# Patient Record
Sex: MALE | Employment: OTHER | URBAN - METROPOLITAN AREA
[De-identification: names, ages, dates, MRNs, and addresses within clinical notes are randomized per-mention and may not be internally consistent; named-entity substitution may affect disease eponyms.]

---

## 2020-09-02 ENCOUNTER — HOSPITAL ENCOUNTER (OUTPATIENT)
Age: 65
Setting detail: SPECIMEN
Discharge: HOME | End: 2020-09-02
Payer: MEDICARE

## 2020-09-02 LAB
DIRECT EXAM: NORMAL
Lab: NORMAL
SPECIMEN DESCRIPTION: NORMAL

## 2020-09-06 LAB
CULTURE: ABNORMAL
DIRECT EXAM: ABNORMAL
Lab: ABNORMAL
SPECIMEN DESCRIPTION: ABNORMAL

## 2021-07-06 ENCOUNTER — HOSPITAL ENCOUNTER (EMERGENCY)
Facility: CLINIC | Age: 66
Discharge: HOME | End: 2021-07-06
Attending: EMERGENCY MEDICINE
Payer: MEDICARE

## 2021-07-06 VITALS
OXYGEN SATURATION: 95 % | RESPIRATION RATE: 16 BRPM | WEIGHT: 295 LBS | BODY MASS INDEX: 34.13 KG/M2 | HEIGHT: 78 IN | HEART RATE: 79 BPM | SYSTOLIC BLOOD PRESSURE: 161 MMHG | TEMPERATURE: 98 F | DIASTOLIC BLOOD PRESSURE: 92 MMHG

## 2021-07-06 DIAGNOSIS — S01.01XA LACERATION OF SCALP, INITIAL ENCOUNTER: Primary | ICD-10-CM

## 2021-07-06 PROCEDURE — 2500000003 HC RX 250 WO HCPCS: Performed by: EMERGENCY MEDICINE

## 2021-07-06 PROCEDURE — 99283 EMERGENCY DEPT VISIT LOW MDM: CPT

## 2021-07-06 PROCEDURE — 6370000000 HC RX 637 (ALT 250 FOR IP): Performed by: EMERGENCY MEDICINE

## 2021-07-06 PROCEDURE — 12002 RPR S/N/AX/GEN/TRNK2.6-7.5CM: CPT

## 2021-07-06 RX ORDER — GINSENG 100 MG
CAPSULE ORAL 3 TIMES DAILY
Status: DISCONTINUED | OUTPATIENT
Start: 2021-07-06 | End: 2021-07-06 | Stop reason: HOSPADM

## 2021-07-06 RX ORDER — LIDOCAINE HYDROCHLORIDE 10 MG/ML
20 INJECTION, SOLUTION INFILTRATION; PERINEURAL ONCE
Status: COMPLETED | OUTPATIENT
Start: 2021-07-06 | End: 2021-07-06

## 2021-07-06 RX ADMIN — Medication 3 ML: at 07:48

## 2021-07-06 RX ADMIN — BACITRACIN: 500 OINTMENT TOPICAL at 08:39

## 2021-07-06 RX ADMIN — LIDOCAINE HYDROCHLORIDE 20 ML: 10 INJECTION, SOLUTION INFILTRATION; PERINEURAL at 08:11

## 2021-07-06 ASSESSMENT — PAIN SCALES - GENERAL
PAINLEVEL_OUTOF10: 1
PAINLEVEL_OUTOF10: 1

## 2021-07-06 ASSESSMENT — PAIN DESCRIPTION - DESCRIPTORS: DESCRIPTORS: ACHING;SHARP

## 2021-07-06 ASSESSMENT — PAIN DESCRIPTION - PAIN TYPE: TYPE: ACUTE PAIN

## 2021-07-06 ASSESSMENT — ENCOUNTER SYMPTOMS
PHOTOPHOBIA: 0
BACK PAIN: 0
NAUSEA: 0

## 2021-07-06 ASSESSMENT — PAIN DESCRIPTION - LOCATION: LOCATION: FACE

## 2021-07-06 ASSESSMENT — PAIN DESCRIPTION - FREQUENCY: FREQUENCY: CONTINUOUS

## 2021-07-06 NOTE — ED PROVIDER NOTES
[facility]  [facility]  Phone: [phone]        Pt Name: Shantelle Cornelius  MRN: [id number]  [place] 1955  Date of evaluation: 7/6/21      CHIEF COMPLAINT     Chief Complaint   Patient presents with    Fall    Laceration         HISTORY OF PRESENT ILLNESS  (Location/Symptom, Timing/Onset, Context/Setting, Quality, Duration, Modifying Factors, Severity.)    Shantelle Cornelius is a 77 y.o. male who presents with a head laceration. The patient states that he hit his head on a cabinet causing approximately 4 cm laceration of the scalp bleeding is controlled with direct pressure no loss of consciousness no headache no visual no hearing changes no neck pain last tetanus is unknown nothing makes his symptoms better or worse      REVIEW OF SYSTEMS    (2-9 systems for level 4, 10 or more for level 5)     Review of Systems   Eyes: Negative for photophobia and visual disturbance. Gastrointestinal: Negative for nausea. Musculoskeletal: Negative for back pain and neck pain. Skin: Positive for wound. Neurological: Negative for weakness, numbness and headaches. PAST MEDICAL HISTORY    has no past medical history on file. SURGICAL HISTORY      has no past surgical history on file. CURRENTMEDICATIONS       Previous Medications    No medications on file       ALLERGIES     has No Known Allergies. FAMILY HISTORY     has no family status information on file. family history is not on file. SOCIAL HISTORY      reports that he has never smoked. He has never used smokeless tobacco. He reports current alcohol use. He reports that he does not use drugs. PHYSICAL EXAM    (up to 7 for level 4, 8 or more for level 5)   INITIAL VITALS:  height is 6' 8\" (2.032 m) and weight is 133.8 kg (295 lb). His oral temperature is 98 °F (36.7 °C). His blood pressure is 161/92 (abnormal) and his pulse is 79. His respiration is 16 and oxygen saturation is 95%.       Physical Exam  Vitals and nursing note reviewed. Constitutional:       Appearance: Normal appearance. HENT:      Head:      Comments: Approximately 4 cm laceration of the scalp no hemotympanum pupils equal round reactive to light     Right Ear: Tympanic membrane normal.      Left Ear: Tympanic membrane normal.   Eyes:      Pupils: Pupils are equal, round, and reactive to light. Neck:      Comments: No posterior neck pain to palpation flexion extension or rotation of the neck  Musculoskeletal:         General: Normal range of motion. Cervical back: Normal range of motion and neck supple. Skin:     Comments: Approximately 4 cm laceration of the scalp otherwise without further rashes or lesions   Neurological:      General: No focal deficit present. Mental Status: He is alert. Comments: GCS of 15 with no focal deficits appreciated         DIFFERENTIAL DIAGNOSIS/ MDM:     I did recommend a tetanus which the patient refuses I discussed the risks and benefits of a CT scan and the patient refuses the CT he states he simply here for laceration repair he had driven to to urgent cares as he only wanted to repair the laceration he is agreeable to us putting sutures into the laceration initially he was requesting Steri-Strips so I will go ahead and apply let to the wound and then repair the laceration    DIAGNOSTIC RESULTS       LABS:  No results found for this visit on 07/06/21.         EMERGENCY DEPARTMENT COURSE:   Vitals:    Vitals:    07/06/21 0733   BP: (!) 161/92   Pulse: 79   Resp: 16   Temp: 98 °F (36.7 °C)   TempSrc: Oral   SpO2: 95%   Weight: 133.8 kg (295 lb)   Height: 6' 8\" (2.032 m)     -------------------------  BP: (!) 161/92, Temp: 98 °F (36.7 °C), Pulse: 79, Resp: 16      RE-EVALUATION:  The patient had repair of his 4 cm scalp laceration with a total of five 4-0 nylon sutures I am recommending that he keep the area clean and dry may apply antibiotic ointment return to the ER for any signs of infection such as redness swelling fever drainage or other concerns otherwise to get a wound check by his family doctor within the next few days and the sutures are to be removed in approximately 7 days    The patient was instructed to follow up in 2-3 days with their family doctor for a wound check. We also discussed returning to the Emergency Department immediately if new or worsening symptoms occur. We have discussed the symptoms which are most concerning (e.g., increasing pain, fever, drainage from the wound or other signs of infection, numbness, weakness, color change or coldness to the extremity) that necessitate immediate return. The patient was advised to keep the wound clean and dry, they may apply antibiotic ointment to the wound. The patient understands that at this time there is no evidence for a more malignant underlying process, but the patient also understands that early in the process of an illness or injury, an emergency department workup can be falsely reassuring. Routine discharge counseling was given, and the patient understands that worsening, changing or persistent symptoms should prompt an immediate call or follow up with their primary physician or return to the emergency department. The importance of appropriate follow up was also discussed. I have reviewed the disposition diagnosis with the patient and or their family/guardian. I have answered their questions and given discharge instructions. They voiced understanding of these instructions and did not have any further questions or complaints.     PROCEDURES:  The patient has LET applied to the wound it is then further anesthetized with approximately 6 cc 1% lidocaine the wound is copiously scrubbed explored there are no foreign bodies no signs of infection the area surgically prepped and draped and a total of five 4 nylon sutures were placed with good approximation of the wound edges no further bleeding appreciated the patient tolerated this well without any complications    FINAL IMPRESSION      1. Laceration of scalp, initial encounter          DISPOSITION/PLAN   DISPOSITION Decision To Discharge 07/06/2021 08:30:13 AM      CONDITION ON DISPOSITION:   Improved - Stable    PATIENT REFERRED TO:  Nessa Marmolejo MD  [facility]. , [id number]  Formerly named [facility] [address] [facility]  [phone]    Call in 2 days        DISCHARGE MEDICATIONS:  New Prescriptions    No medications on file       (Please note that portions of this note were completed with a voicerecognition program.  Efforts were made to edit the dictations but occasionally words are mis-transcribed.)    Yomi Chacko MD,, MD, F.A.C.E.P.   Attending Emergency Medicine Physician       Yomi Chacko MD  07/06/21 2573

## 2022-09-20 ENCOUNTER — HOSPITAL ENCOUNTER (EMERGENCY)
Facility: CLINIC | Age: 67
Discharge: ANOTHER HOSPITAL | DRG: 871 | End: 2022-09-20
Attending: EMERGENCY MEDICINE
Payer: MEDICARE

## 2022-09-20 ENCOUNTER — APPOINTMENT (OUTPATIENT)
Dept: CT IMAGING | Facility: CLINIC | Age: 67
DRG: 871 | End: 2022-09-20
Payer: MEDICARE

## 2022-09-20 ENCOUNTER — APPOINTMENT (OUTPATIENT)
Dept: GENERAL RADIOLOGY | Facility: CLINIC | Age: 67
DRG: 871 | End: 2022-09-20
Payer: MEDICARE

## 2022-09-20 ENCOUNTER — HOSPITAL ENCOUNTER (INPATIENT)
Age: 67
LOS: 2 days | Discharge: HOME | DRG: 871 | End: 2022-09-22
Attending: EMERGENCY MEDICINE
Payer: MEDICARE

## 2022-09-20 VITALS
OXYGEN SATURATION: 98 % | RESPIRATION RATE: 16 BRPM | SYSTOLIC BLOOD PRESSURE: 107 MMHG | TEMPERATURE: 98.9 F | BODY MASS INDEX: 32.4 KG/M2 | WEIGHT: 280 LBS | HEIGHT: 78 IN | DIASTOLIC BLOOD PRESSURE: 76 MMHG | HEART RATE: 80 BPM

## 2022-09-20 DIAGNOSIS — J05.10 ACUTE EPIGLOTTITIS WITHOUT AIRWAY OBSTRUCTION: Primary | ICD-10-CM

## 2022-09-20 DIAGNOSIS — U07.1 COVID-19: ICD-10-CM

## 2022-09-20 DIAGNOSIS — J05.10 EPIGLOTTITIS: Primary | ICD-10-CM

## 2022-09-20 LAB
ANION GAP SERPL CALCULATED.3IONS-SCNC: 10 MMOL/L (ref 9–17)
BUN BLDV-MCNC: 15 MG/DL (ref 8–23)
CALCIUM SERPL-MCNC: 9.2 MG/DL (ref 8.6–10.4)
CHLORIDE BLD-SCNC: 97 MMOL/L (ref 98–107)
CO2: 27 MMOL/L (ref 20–31)
CREAT SERPL-MCNC: 1.1 MG/DL (ref 0.7–1.2)
FLU A ANTIGEN: NEGATIVE
FLU B ANTIGEN: NEGATIVE
GFR AFRICAN AMERICAN: >60 ML/MIN
GFR NON-AFRICAN AMERICAN: >60 ML/MIN
GFR SERPL CREATININE-BSD FRML MDRD: ABNORMAL ML/MIN/{1.73_M2}
GLUCOSE BLD-MCNC: 139 MG/DL (ref 70–99)
LACTIC ACID: 1.2 MMOL/L (ref 0.5–2.2)
POTASSIUM SERPL-SCNC: 3.7 MMOL/L (ref 3.7–5.3)
S PYO AG THROAT QL: NEGATIVE
SARS-COV-2, RAPID: DETECTED
SODIUM BLD-SCNC: 134 MMOL/L (ref 135–144)
SOURCE: NORMAL
SPECIMEN DESCRIPTION: ABNORMAL

## 2022-09-20 PROCEDURE — 71046 X-RAY EXAM CHEST 2 VIEWS: CPT

## 2022-09-20 PROCEDURE — 6360000002 HC RX W HCPCS: Performed by: REGISTERED NURSE

## 2022-09-20 PROCEDURE — 99285 EMERGENCY DEPT VISIT HI MDM: CPT

## 2022-09-20 PROCEDURE — 70360 X-RAY EXAM OF NECK: CPT

## 2022-09-20 PROCEDURE — 36415 COLL VENOUS BLD VENIPUNCTURE: CPT

## 2022-09-20 PROCEDURE — 6370000000 HC RX 637 (ALT 250 FOR IP): Performed by: REGISTERED NURSE

## 2022-09-20 PROCEDURE — 87880 STREP A ASSAY W/OPTIC: CPT

## 2022-09-20 PROCEDURE — 96374 THER/PROPH/DIAG INJ IV PUSH: CPT

## 2022-09-20 PROCEDURE — 70491 CT SOFT TISSUE NECK W/DYE: CPT

## 2022-09-20 PROCEDURE — 99221 1ST HOSP IP/OBS SF/LOW 40: CPT | Performed by: OTOLARYNGOLOGY

## 2022-09-20 PROCEDURE — 96375 TX/PRO/DX INJ NEW DRUG ADDON: CPT

## 2022-09-20 PROCEDURE — 6360000004 HC RX CONTRAST MEDICATION: Performed by: EMERGENCY MEDICINE

## 2022-09-20 PROCEDURE — 96376 TX/PRO/DX INJ SAME DRUG ADON: CPT

## 2022-09-20 PROCEDURE — 83605 ASSAY OF LACTIC ACID: CPT

## 2022-09-20 PROCEDURE — 87040 BLOOD CULTURE FOR BACTERIA: CPT

## 2022-09-20 PROCEDURE — 87635 SARS-COV-2 COVID-19 AMP PRB: CPT

## 2022-09-20 PROCEDURE — 2580000003 HC RX 258: Performed by: EMERGENCY MEDICINE

## 2022-09-20 PROCEDURE — 87804 INFLUENZA ASSAY W/OPTIC: CPT

## 2022-09-20 PROCEDURE — 6360000002 HC RX W HCPCS: Performed by: STUDENT IN AN ORGANIZED HEALTH CARE EDUCATION/TRAINING PROGRAM

## 2022-09-20 PROCEDURE — 2060000000 HC ICU INTERMEDIATE R&B

## 2022-09-20 PROCEDURE — 2580000003 HC RX 258: Performed by: REGISTERED NURSE

## 2022-09-20 PROCEDURE — 80048 BASIC METABOLIC PNL TOTAL CA: CPT

## 2022-09-20 PROCEDURE — 85025 COMPLETE CBC W/AUTO DIFF WBC: CPT

## 2022-09-20 RX ORDER — 0.9 % SODIUM CHLORIDE 0.9 %
1000 INTRAVENOUS SOLUTION INTRAVENOUS ONCE
Status: COMPLETED | OUTPATIENT
Start: 2022-09-20 | End: 2022-09-20

## 2022-09-20 RX ORDER — 0.9 % SODIUM CHLORIDE 0.9 %
70 INTRAVENOUS SOLUTION INTRAVENOUS ONCE
Status: COMPLETED | OUTPATIENT
Start: 2022-09-20 | End: 2022-09-20

## 2022-09-20 RX ORDER — ACETAMINOPHEN 500 MG
1000 TABLET ORAL ONCE
Status: COMPLETED | OUTPATIENT
Start: 2022-09-20 | End: 2022-09-20

## 2022-09-20 RX ORDER — DEXAMETHASONE SODIUM PHOSPHATE 10 MG/ML
10 INJECTION INTRAMUSCULAR; INTRAVENOUS ONCE
Status: COMPLETED | OUTPATIENT
Start: 2022-09-20 | End: 2022-09-20

## 2022-09-20 RX ORDER — DEXAMETHASONE SODIUM PHOSPHATE 10 MG/ML
10 INJECTION, SOLUTION INTRAMUSCULAR; INTRAVENOUS ONCE
Status: COMPLETED | OUTPATIENT
Start: 2022-09-20 | End: 2022-09-20

## 2022-09-20 RX ORDER — SODIUM CHLORIDE 0.9 % (FLUSH) 0.9 %
10 SYRINGE (ML) INJECTION PRN
Status: DISCONTINUED | OUTPATIENT
Start: 2022-09-20 | End: 2022-09-20 | Stop reason: HOSPADM

## 2022-09-20 RX ORDER — KETOROLAC TROMETHAMINE 15 MG/ML
15 INJECTION, SOLUTION INTRAMUSCULAR; INTRAVENOUS ONCE
Status: COMPLETED | OUTPATIENT
Start: 2022-09-20 | End: 2022-09-20

## 2022-09-20 RX ADMIN — DEXAMETHASONE SODIUM PHOSPHATE 10 MG: 10 INJECTION, SOLUTION INTRAMUSCULAR; INTRAVENOUS at 14:59

## 2022-09-20 RX ADMIN — DEXAMETHASONE SODIUM PHOSPHATE 10 MG: 10 INJECTION INTRAMUSCULAR; INTRAVENOUS at 19:25

## 2022-09-20 RX ADMIN — SODIUM CHLORIDE 70 ML: 9 INJECTION, SOLUTION INTRAVENOUS at 16:31

## 2022-09-20 RX ADMIN — SODIUM CHLORIDE 1000 ML: 9 INJECTION, SOLUTION INTRAVENOUS at 17:19

## 2022-09-20 RX ADMIN — IOPAMIDOL 75 ML: 755 INJECTION, SOLUTION INTRAVENOUS at 16:31

## 2022-09-20 RX ADMIN — KETOROLAC TROMETHAMINE 15 MG: 15 INJECTION, SOLUTION INTRAMUSCULAR; INTRAVENOUS at 14:59

## 2022-09-20 RX ADMIN — SODIUM CHLORIDE, PRESERVATIVE FREE 10 ML: 5 INJECTION INTRAVENOUS at 16:32

## 2022-09-20 RX ADMIN — ACETAMINOPHEN 1000 MG: 500 TABLET ORAL at 14:57

## 2022-09-20 RX ADMIN — SODIUM CHLORIDE 1000 ML: 9 INJECTION, SOLUTION INTRAVENOUS at 15:01

## 2022-09-20 RX ADMIN — CEFTRIAXONE SODIUM 1000 MG: 1 INJECTION, POWDER, FOR SOLUTION INTRAMUSCULAR; INTRAVENOUS at 16:12

## 2022-09-20 ASSESSMENT — LIFESTYLE VARIABLES: HOW OFTEN DO YOU HAVE A DRINK CONTAINING ALCOHOL: NEVER

## 2022-09-20 ASSESSMENT — ENCOUNTER SYMPTOMS
SORE THROAT: 1
WHEEZING: 0
DIARRHEA: 0
ABDOMINAL PAIN: 0
VOICE CHANGE: 1
TROUBLE SWALLOWING: 0
STRIDOR: 0
FACIAL SWELLING: 0
VOMITING: 0
COUGH: 1
SHORTNESS OF BREATH: 0
VOICE CHANGE: 1
TROUBLE SWALLOWING: 1
RHINORRHEA: 0
ABDOMINAL PAIN: 0
NAUSEA: 0
NAUSEA: 0
SORE THROAT: 1
VOMITING: 0
SHORTNESS OF BREATH: 0
BACK PAIN: 0
DIARRHEA: 0

## 2022-09-20 ASSESSMENT — PAIN - FUNCTIONAL ASSESSMENT: PAIN_FUNCTIONAL_ASSESSMENT: 0-10

## 2022-09-20 ASSESSMENT — PAIN SCALES - GENERAL: PAINLEVEL_OUTOF10: 5

## 2022-09-20 NOTE — ED NOTES
First set of culture taken from the left forearm after starting antibiotic. 9 mL in orange, 10 mL in green.      Mekhi Temple RN  09/20/22 2529

## 2022-09-20 NOTE — ED PROVIDER NOTES
[facility] ED  [address] [facility]  Phone: [phone]      Attending Physician [address]       Chief Complaint   Patient presents with    Pharyngitis       DIAGNOSTIC RESULTS     LABS:  Labs Reviewed   COVID-19, RAPID - Abnormal; Notable for the following components:       Result Value    SARS-CoV-2, Rapid DETECTED (*)     All other components within normal limits   CBC WITH AUTO DIFFERENTIAL - Abnormal; Notable for the following components:    WBC 18.3 (*)     Monocytes 9 (*)     Eosinophils % 0 (*)     Segs Absolute 11.53 (*)     Absolute Lymph # 5.12 (*)     Absolute Mono # 1.65 (*)     All other components within normal limits   BASIC METABOLIC PANEL - Abnormal; Notable for the following components:    Glucose 139 (*)     Sodium 134 (*)     Chloride 97 (*)     All other components within normal limits   STREP SCREEN GROUP A THROAT   RAPID INFLUENZA A/B ANTIGENS   CULTURE, BLOOD 1   CULTURE, BLOOD 1   LACTIC ACID       All other labs were within normal range or not returned as of this dictation. RADIOLOGY:  CT SOFT TISSUE NECK W CONTRAST   Final Result   Findings of acute supraglottitis/epiglottitis with associated moderate to   severe airway narrowing. No evidence of abscess or enhancing mass to suggest   malignancy. Findings were discussed with Dr. Lynch at 5:08 pm on 9/20/2022. XR NECK SOFT TISSUE   Final Result   Blunted thumb like epiglottis. Epiglottitis not excluded. RECOMMENDATION:   Advise CT soft tissues neck.          XR CHEST (2 VW)   Final Result   No acute cardiopulmonary disease               EMERGENCY DEPARTMENT COURSE:   Vitals:    Vitals:    09/20/22 1437 09/20/22 1606   BP: 129/86 96/66   Pulse: (!) 105 97   Resp: 18 16   Temp: 100.4 °F (38 °C)    TempSrc: Oral    SpO2: 94% 95%   Weight: 127 kg (280 lb)    Height: 6' 9\" (2.057 m)      -------------------------  BP: 96/66, Temp: 100.4 °F (38 °C), Heart Rate: 97, Resp: 16      ED Course as of 09/20/22 1723   Tue Sep 20, 2022   1708 Dr. Silvio De La Paz spoke with radiologist regarding CT scan, CT does showed epiglottitis with moderate airway narrowing. [TM]   7675 Spoke with Dr. Lisa Decker, attending at [facility], he does accept the patient as a transfer for ER to ER for acute epiglottitis. Access to arrange stat transport, will page ENT at [facility]. [TM]      ED Course User Index  [TM] [address], APRN - CNP         PERTINENT ATTENDING PHYSICIAN COMMENTS:    I performed a history and physical examination of the patient and discussed management with the mid level provider. I reviewed the mid level provider's note and agree with the documented findings and plan of care. Any areas of disagreement are noted on the chart. I was personally present for the key portions of any procedures. I have documented in the chart those procedures where I was not present during the key portions. I have reviewed the emergency nurses triage note. I agree with the chief complaint, past medical history, past surgical history, allergies, medications, social and family history as documented unless otherwise noted below. Documentation of the HPI, Physical Exam and Medical Decision Making performed by mid level providers is based on my personal performance of the HPI, PE and MDM. For Physician Assistant/ Nurse Practitioner cases/documentation I have personally evaluated this patient and have completed at least one if not all key elements of the E/M (history, physical exam, and MDM). Additional findings are as noted. Patient had presented to the emergency department with sore throat and cough. On presentation, seem to be having a little bit of intolerance of oral secretions, phonation sounded to be a little bit coarse. Work-up was initially broad and included blood work, COVID swab, strep screen, chest x-ray and neck x-ray. COVID swab was positive. Strep screen was negative.   Chest x-ray was negative. Throat x-ray revealed findings concerning for epiglottitis so a CT scan was ordered. He had a white blood cell count of 18,000. He was presumptively started on antibiotics, shortly after blood cultures and lactic acid were added on but we did not want to hold up antibiotic administration. Got CT scan of the neck which did reveal epiglottitis. His condition remained relatively stable in the emergency department, spoke to the attending physician, Dr. Lisa Alvarez at [facility] who is excepting the patient in transfer    Critical Care: The high probability of sudden, clinically significant deterioration in the patient's condition required the highest level of my preparedness to intervene urgently. The services I provided to this patient were to treat and/or prevent clinically significant deterioration. Services included the following: chart data review, reviewing nursing notes and/or old charts, documentation time, consultant collaboration regarding findings and treatment options, medication orders and management, direct patient care, vital sign assessments and ordering, interpreting and reviewing diagnostic studies/lab tests. Aggregate critical care time includes only time during which I was engaged in work directly related to the patient's care, as described above, whether at the bedside or elsewhere in the Emergency Department. It did not include time spent performing other reported procedures or the services of residents, students, nurses or physician assistants.     Critical Care:   35 minutes      (Please note that portions of this note were completed with a voice recognition program.  Efforts were made to edit the dictations but occasionally words are mis-transcribed.)    Maday Murdock DO  Attending Emergency Medicine Physician       Maday Murdock DO  09/20/22 3970

## 2022-09-20 NOTE — ED NOTES
[facility] paged to consult ENT at [facility]     Hayden Zambrano RN  09/20/22 5743

## 2022-09-20 NOTE — ED NOTES
Pt SpO2 dropped to 83%, placed on 2 L increased to 96 % on O2. No drooling is noted at this time. Pt is able to speak with a hoarse voice.       Karla Magdaleno RN  09/20/22 9542

## 2022-09-20 NOTE — ED NOTES
1 IV attempt in [address] [place] [address], unable to obtain line, 2x2 applied     [employer], RN  09/20/22 5888

## 2022-09-20 NOTE — ED NOTES
Called Nicole, request to speak to ER attending at [facility]. V  for ER to ER transfer.      Crissy Colmenares RN  09/20/22 4738

## 2022-09-20 NOTE — ED PROVIDER NOTES
[address] ED  Emergency Department Encounter  Emergency Medicine Resident     Pt Chilo Madelaine  MRN: [id number]  [place] 1955  Date of evaluation: 9/20/22  PCP:  Gloria Welsh MD      [address]       Chief Complaint   Patient presents with    Pharyngitis     Since Saturday, epiglottitis, denies SOB       HISTORY OF PRESENT ILLNESS  (Location/Symptom, Timing/Onset, Context/Setting, Quality, Duration, Modifying Factors, Severity.)      Tonja Golden is a 79 y.o. male with no significant PMH who presents as a transfer from [place] ED for epiglottitis and need for ENT. Patient was seen around 1800 today at [facility] for 2 days of sore throat and cough. Tested positive for COVID there. He has been having voice changes which is ultimately the reason he presented to [facility]. He received Decadron and Rocephin there prior to transfer. CT imaging showed acute supraglottitis/epiglottitis with moderate to severe airway narrowing. ED physician spoke with [facility]. [facility] attending, then Dr. Nu Real, ENT at [facility]. [facility] was contacted. PAST MEDICAL / SURGICAL / SOCIAL / FAMILY HISTORY      has no past medical history on file. Reviewed. None on file     has a past surgical history that includes Achilles tendon surgery (Bilateral).       Social History     Socioeconomic History    Marital status:      Spouse name: Not on file    Number of children: Not on file    Years of education: Not on file    Highest education level: Not on file   Occupational History    Not on file   Tobacco Use    Smoking status: Never    Smokeless tobacco: Never   Vaping Use    Vaping Use: Never used   Substance and Sexual Activity    Alcohol use: Yes     Comment: occasionally    Drug use: Never    Sexual activity: Not on file   Other Topics Concern    Not on file   Social History Narrative    Not on file     Social Determinants of Health     Financial Resource Strain: Not on file   Food Insecurity: Not on file Transportation Needs: Not on file   Physical Activity: Not on file   Stress: Not on file   Social Connections: Not on file   Intimate Partner Violence: Not on file   Housing Stability: Not on file       History reviewed. No pertinent family history. Allergies:  Patient has no known allergies. Home Medications:  Prior to Admission medications    Not on File       REVIEW OF SYSTEMS    (2-9 systems for level 4, 10 or more for level 5)      Review of Systems   Constitutional:  Positive for chills, fatigue and fever. HENT:  Positive for congestion, sore throat, trouble swallowing and voice change. Negative for drooling, facial swelling and nosebleeds. Eyes:  Negative for visual disturbance. Respiratory:  Negative for shortness of breath. Cardiovascular:  Negative for chest pain. Gastrointestinal:  Negative for abdominal pain, diarrhea, nausea and vomiting. Musculoskeletal:  Positive for neck pain (anterior). Negative for arthralgias and myalgias. Neurological:  Negative for headaches. Psychiatric/Behavioral:  The patient is not nervous/anxious. PHYSICAL EXAM   (up to 7 for level 4, 8 or more for level 5)      INITIAL VITALS:   /80   Pulse 57   Temp 98.4 °F (36.9 °C) (Oral)   Resp 13   Wt 275 lb (124.7 kg)   SpO2 97%   BMI 29.47 kg/m²     Physical Exam  Constitutional:       General: He is not in acute distress. Appearance: He is well-developed. He is not ill-appearing or toxic-appearing. HENT:      Head: Normocephalic and atraumatic. Right Ear: Ear canal normal. No tenderness. Left Ear: Ear canal normal. No tenderness. Nose: Congestion present. No rhinorrhea. Mouth/Throat:      Mouth: Mucous membranes are moist.      Pharynx: Uvula midline. No oropharyngeal exudate, posterior oropharyngeal erythema or uvula swelling. Tonsils: No tonsillar abscesses. 0 on the right. 0 on the left.    Eyes:      Conjunctiva/sclera: Conjunctivae normal.      Pupils: Pupils are equal, round, and reactive to light. Neck:      Comments: Anterior supraglotic mild swelling  Cardiovascular:      Rate and Rhythm: Normal rate and regular rhythm. Heart sounds: Normal heart sounds. No murmur heard. Pulmonary:      Effort: Pulmonary effort is normal. No respiratory distress. Breath sounds: Wheezing present. Abdominal:      General: Bowel sounds are normal. There is no distension. Palpations: Abdomen is soft. Tenderness: There is no abdominal tenderness. There is no guarding. Musculoskeletal:      Cervical back: Normal range of motion and neck supple. Skin:     General: Skin is warm and dry. Coloration: Skin is not pale. Findings: No erythema. Neurological:      Mental Status: He is alert. Psychiatric:         Mood and Affect: Mood normal.         Behavior: Behavior normal.       DIFFERENTIAL  DIAGNOSIS     PLAN (LABS / IMAGING / EKG):  Orders Placed This Encounter   Procedures    Inpatient consult to Otolaryngology    Inpatient consult to Critical Care    Inpatient consult to Internal Medicine       MEDICATIONS ORDERED:  Orders Placed This Encounter   Medications    dexamethasone (DECADRON) injection 10 mg    DISCONTD: cefTRIAXone (ROCEPHIN) 1,000 mg in sodium chloride 0.9 % 50 mL IVPB mini-bag     Order Specific Question:   Antimicrobial Indications     Answer:   Head and Neck Infection       DDX: epiglottitis    DIAGNOSTIC RESULTS / EMERGENCY DEPARTMENT COURSE / MDM   LAB RESULTS:  No results found for this visit on 09/20/22. IMPRESSION: n/a    RADIOLOGY:  No orders to display       EKG  N/a    All EKG's are interpreted by the Emergency Department Physician who either signs or Co-signs this chart in the absence of a cardiologist.    EMERGENCY DEPARTMENT COURSE:    ED Course as of 09/20/22 2259   Tue Sep 20, 2022   1935 79 y.o. male with no significant PMH who presents as a transfer from [facility] ED for epiglottitis and need for ENT. Patient speaking in full sentences without stridor or respiratory distress. No need for intubation at this time. He is protecting his airway well and satting well on minimal nasal cannula [GC]   1936 Discussed case with ENT who will see the patient. Holding off on intubation, but will need to be monitored closely in appropriate setting [GC]   2017 Consult to critical care placed for close observation [GC]   2140 [facility] consulted for admission to step down unit after speaking with critical care and ENT [GC]   2258 [facility] accepted patient. [GC]      ED Course User Index  [GC] Jarad Garcia DO       No notes of EC Admission Criteria type on file. PROCEDURES:  N/a    CONSULTS:  IP CONSULT TO OTOLARYNGOLOGY  IP CONSULT TO CRITICAL CARE  IP CONSULT TO INTERNAL MEDICINE    CRITICAL CARE:  N/a    FINAL IMPRESSION      1. Epiglottitis          DISPOSITION / PLAN     DISPOSITION Decision To Admit 09/20/2022 09:59:01 PM      PATIENT REFERRED TO:  No follow-up provider specified.     DISCHARGE MEDICATIONS:  New Prescriptions    No medications on file       Jarad Garcia DO  Emergency Medicine Resident    (Please note that portions of thisnote were completed with a voice recognition program.  Efforts were made to edit the dictations but occasionally words are mis-transcribed.)       Jarad Garcia DO  Resident  09/20/22 3148

## 2022-09-20 NOTE — ED PROVIDER NOTES
[facility]  [facility]  Phone: [phone]        Pt Name: Claudio Akhtar  MRN: [id number]  [place] 1955  Date of evaluation: 9/20/22    [address] [place] [address]       Chief Complaint   Patient presents with    Pharyngitis       HISTORY OF PRESENT ILLNESS (Location/Symptom, Timing/Onset, Context/Setting, Quality, Duration, Modifying Factors, Severity)      Claudio Akhtar is a 79 y.o. male with no pertinent PMH who presents to the ED via private auto with sore throat and cough ongoing for the last 2 days. Patient states he did COVID test at home that was negative. Patient states had sore throat and is tried taking over-the-counter medications with minimal relief of symptoms. He states is able to eat and drink normally but does have some discomfort when he swallows. He denies any chest pain, shortness of breath, difficulty breathing, fevers or chills, abdominal pain, nausea vomiting or diarrhea. States that the cough is nonproductive. He states that he has not vaccinated against COVID. PAST MEDICAL / SURGICAL / SOCIAL / FAMILY HISTORY     PMH:  has no past medical history on file. Surgical History:  has a past surgical history that includes Achilles tendon surgery (Bilateral). Social History:  reports that he has never smoked. He has never used smokeless tobacco. He reports current alcohol use. He reports that he does not use drugs. Family History: has no family status information on file. family history is not on file. Psychiatric History: None    Allergies: Patient has no known allergies. Home Medications:   Prior to Admission medications    Not on File       REVIEW OF SYSTEMS  (2-9 systems for level 4, 10 ormore for level 5)      Review of Systems   Constitutional:  Negative for chills and fever. HENT:  Positive for sore throat and voice change. Negative for congestion, rhinorrhea and trouble swallowing. Respiratory:  Positive for cough.  Negative for shortness of breath, wheezing and stridor. Cardiovascular:  Negative for chest pain, palpitations and leg swelling. Gastrointestinal:  Negative for abdominal pain, diarrhea, nausea and vomiting. Musculoskeletal:  Negative for back pain, neck pain and neck stiffness. Neurological:  Negative for dizziness and headaches. All other systems negative except as marked. PHYSICAL EXAM  (up to 7 for level 4, 8 or more for level 5)      INITIAL VITALS:  height is 6' 9\" (2.057 m) and weight is 127 kg (280 lb). His oral temperature is 98.9 °F (37.2 °C). His blood pressure is 107/76 and his pulse is 80. His respiration is 16 and oxygen saturation is 98%. Vital signs reviewed. Physical Exam  Constitutional:       General: He is not in acute distress. Appearance: He is well-developed. He is not ill-appearing or toxic-appearing. HENT:      Head: Normocephalic and atraumatic. Nose: Congestion and rhinorrhea present. Mouth/Throat:      Mouth: Mucous membranes are dry. Pharynx: Uvula midline. No pharyngeal swelling, posterior oropharyngeal erythema or uvula swelling. Tonsils: No tonsillar abscesses. 1+ on the right. 1+ on the left. Comments: Dry, cracked oral mucosa. No bleeding or drainage noted. Neck:      Trachea: No tracheal tenderness or tracheal deviation. Comments: Abnormal phonation. Cardiovascular:      Rate and Rhythm: Regular rhythm. Tachycardia present. Heart sounds: Normal heart sounds. Pulmonary:      Effort: Pulmonary effort is normal.      Breath sounds: Normal breath sounds. Abdominal:      General: Bowel sounds are normal. There is no distension. Palpations: Abdomen is soft. There is no mass. Tenderness: There is no abdominal tenderness. There is no rebound. Musculoskeletal:      Cervical back: Neck supple. No rigidity. Lymphadenopathy:      Cervical: No cervical adenopathy. Skin:     General: Skin is warm and dry.       Capillary Refill: Capillary refill takes less than 2 seconds. Neurological:      Mental Status: He is alert. Psychiatric:         Mood and Affect: Mood normal.         Behavior: Behavior normal.         DIFFERENTIAL DIAGNOSIS / MDM     After my physical exam, plan obtain rapid strep, rapid COVID rapid flu, baseline labs, chest x-ray and x-ray of soft tissue neck. We will give patient saline bolus along with Toradol, Tylenol and Decadron. Patient does not have any respiratory distress on arrival.  Strep was negative. Flu was negative. COVID was positive. BMP is unremarkable. CBC shows leukocytosis at 18. 3. Chest x-ray shows no acute cardiopulmonary disease. X-ray of neck shows 1 with homelike epiglottis, epiglottitis and not excluded, will obtain CT soft tissue neck. Will provide patient with IV antibiotics at this time. CT does show acute supraglottitis/epiglottitis, will page [facility]'s ER for transfer. Patient accepted as a ER to ER transfer by attending at [facility]. Miriam, will page ENT at [place] [id number] access to arrange stat transport. Discussed case with ENT at [facility]. Miriam, he recommends using humidified oxygen and will see patient at [facility]. ICU transfer arrived, patient was in stable condition upon departure for emergency department.     PLAN (LABS / IMAGING / EKG):  Orders Placed This Encounter   Procedures    Strep Screen Group A Throat    COVID-19, Rapid    Rapid Influenza A/B Antigens    Culture, Blood 1    Culture, Blood 1    XR NECK SOFT TISSUE    XR CHEST (2 VW)    CT SOFT TISSUE NECK W CONTRAST    CBC with Auto Differential    Basic Metabolic Panel    Lactic Acid       MEDICATIONS ORDERED:  Orders Placed This Encounter   Medications    0.9 % sodium chloride bolus    ketorolac (TORADOL) injection 15 mg    acetaminophen (TYLENOL) tablet 1,000 mg    dexamethasone (PF) (DECADRON) injection 10 mg    cefTRIAXone (ROCEPHIN) 1,000 mg in sterile water 10 mL IV syringe     Order Specific Question:   Antimicrobial Indications     Answer:   Head and Neck Infection    0.9 % sodium chloride bolus    iopamidol (ISOVUE-370) 76 % injection 75 mL    sodium chloride flush 0.9 % injection 10 mL    0.9 % sodium chloride bolus       Controlled Substances Monitoring:     DIAGNOSTIC RESULTS     EKG: All EKG's are interpreted by the Emergency Department Physician who either signs or Co-signs this chart in the absenceof a cardiologist.        RADIOLOGY: All images are read by the radiologist and their interpretations are reviewed. CT SOFT TISSUE NECK W CONTRAST   Final Result   Findings of acute supraglottitis/epiglottitis with associated moderate to   severe airway narrowing. No evidence of abscess or enhancing mass to suggest   malignancy. Findings were discussed with Dr. Robert Fisher at 5:08 pm on 9/20/2022. XR NECK SOFT TISSUE   Final Result   Blunted thumb like epiglottis. Epiglottitis not excluded. RECOMMENDATION:   Advise CT soft tissues neck. XR CHEST (2 VW)   Final Result   No acute cardiopulmonary disease             No results found. LABS:  Results for orders placed or performed during the hospital encounter of 09/20/22   Strep Screen Group A Throat    Specimen: Throat   Result Value Ref Range    Source . THROAT SWAB     Strep A Ag NEGATIVE NEGATIVE   COVID-19, Rapid    Specimen: Nasopharyngeal Swab   Result Value Ref Range    Specimen Description . NASOPHARYNGEAL SWAB     SARS-CoV-2, Rapid DETECTED (A) Not Detected   Rapid Influenza A/B Antigens    Specimen: Nasopharyngeal   Result Value Ref Range    Flu A Antigen NEGATIVE NEGATIVE    Flu B Antigen NEGATIVE NEGATIVE   CBC with Auto Differential   Result Value Ref Range    WBC 18.3 (H) 3.5 - 11.0 k/uL    RBC 4.93 4.5 - 5.9 m/uL    Hemoglobin 14.5 13.5 - 17.5 g/dL    Hematocrit 44.0 41 - 53 %    MCV 89.3 80 - 100 fL    MCH 29.3 26 - 34 pg    MCHC 32.8 31 - 37 g/dL    RDW 14.8 12.5 - 15.4 %    Platelets 057 375 - 792 k/uL    MPV 6.7 6.0 - 12.0 fL    Seg Neutrophils 63 36 - 66 %    Lymphocytes 28 24 - 44 %    Monocytes 9 (H) 1 - 7 %    Eosinophils % 0 (L) 1 - 4 %    Basophils 0 0 - 2 %    Segs Absolute 11.53 (H) 1.8 - 7.7 k/uL    Absolute Lymph # 5.12 (H) 1.0 - 4.8 k/uL    Absolute Mono # 1.65 (H) 0.1 - 0.8 k/uL    Absolute Eos # 0.00 0.0 - 0.4 k/uL    Basophils Absolute 0.00 0.0 - 0.2 k/uL    Morphology ANISOCYTOSIS PRESENT     Morphology INCREASED BANDS PRESENT     Morphology 1+ ELLIPTOCYTES    Basic Metabolic Panel   Result Value Ref Range    Glucose 139 (H) 70 - 99 mg/dL    BUN 15 8 - 23 mg/dL    Creatinine 1.10 0.70 - 1.20 mg/dL    Calcium 9.2 8.6 - 10.4 mg/dL    Sodium 134 (L) 135 - 144 mmol/L    Potassium 3.7 3.7 - 5.3 mmol/L    Chloride 97 (L) 98 - 107 mmol/L    CO2 27 20 - 31 mmol/L    Anion Gap 10 9 - 17 mmol/L    GFR Non-African American >60 >60 mL/min    GFR African American >60 >60 mL/min    GFR Comment         Lactic Acid   Result Value Ref Range    Lactic Acid 1.2 0.5 - 2.2 mmol/L       EMERGENCY DEPARTMENT COURSE     ED Course as of 09/20/22 1836 Tue Sep 20, 2022   1708 Dr. Slava Das spoke with radiologist regarding CT scan, CT does showed epiglottitis with moderate airway narrowing. [TM]   4022 Spoke with Dr. Soumya Arriaga, attending at [facility]. Miriam, he does accept the patient as a transfer for ER to ER for acute epiglottitis.   Access to arrange stat transport, will page ENT at [facility]. [TM]   [address] with Dr. Graham Rodriguez, ENT at [facility]. Miriam, discussed case and he recommends patient is on oxygen to use humidified oxygen and he will see patient at [facility]. [TM]      ED Course User Index  [TM] Sunita Reis, APRN - CNP        Vitals:    Vitals:    09/20/22 1437 09/20/22 1606 09/20/22 1752   BP: 129/86 96/66 107/76   Pulse: (!) 105 97 80   Resp: 18 16 16   Temp: 100.4 °F (38 °C)  98.9 °F (37.2 °C)   TempSrc: Oral  Oral   SpO2: 94% 95% 98%   Weight: 127 kg (280 lb)     Height: 6' 9\" (2.057 m) -------------------------  BP: 107/76, Temp: 98.9 °F (37.2 °C), Heart Rate: 80, Resp: 16      RE-EVALUATION:  See ED Course notes above. CONSULTS:  None    PROCEDURES:  None    FINAL IMPRESSION      1. Acute epiglottitis without airway obstruction    2. COVID-19          DISPOSITION / PLAN     CONDITION ON DISPOSITION:   Stable    PATIENT REFERRED TO:  No follow-up provider specified.     DISCHARGE MEDICATIONS:  New Prescriptions    No medications on file       DEV Valdez - [place]   Emergency Medicine Nurse Practitioner    (Please note that portions of this note were completed with a voice recognition program.  Efforts were made to edit the dictations but occasionally words aremis-transcribed.)       DEV Valdez - [place]  09/20/22 9330

## 2022-09-21 PROBLEM — U07.1 COVID-19: Status: ACTIVE | Noted: 2022-09-21

## 2022-09-21 PROBLEM — D72.823 LEUKEMOID REACTION: Status: ACTIVE | Noted: 2022-09-21

## 2022-09-21 PROBLEM — E87.20 LACTIC ACIDOSIS: Status: ACTIVE | Noted: 2022-09-21

## 2022-09-21 PROBLEM — R61 DIAPHORESIS: Status: ACTIVE | Noted: 2022-09-21

## 2022-09-21 PROBLEM — R79.82 ELEVATED C-REACTIVE PROTEIN (CRP): Status: ACTIVE | Noted: 2022-09-21

## 2022-09-21 PROBLEM — R73.9 HYPERGLYCEMIA: Status: ACTIVE | Noted: 2022-09-21

## 2022-09-21 PROBLEM — J98.8 AIRWAY COMPROMISE: Status: ACTIVE | Noted: 2022-09-21

## 2022-09-21 PROBLEM — E55.9 VITAMIN D DEFICIENCY: Status: ACTIVE | Noted: 2022-09-21

## 2022-09-21 PROBLEM — D69.6 THROMBOCYTOPENIA (HCC): Status: ACTIVE | Noted: 2022-09-21

## 2022-09-21 PROBLEM — U07.1 COVID: Status: ACTIVE | Noted: 2022-09-21

## 2022-09-21 PROBLEM — E87.1 ACUTE HYPONATREMIA: Status: ACTIVE | Noted: 2022-09-21

## 2022-09-21 LAB
ABSOLUTE EOS #: 0 K/UL (ref 0–0.4)
ABSOLUTE EOS #: 0 K/UL (ref 0–0.4)
ABSOLUTE IMMATURE GRANULOCYTE: 0.6 K/UL (ref 0–0.3)
ABSOLUTE LYMPH #: 1.99 K/UL (ref 1–4.8)
ABSOLUTE LYMPH #: 5.12 K/UL (ref 1–4.8)
ABSOLUTE MONO #: 0.4 K/UL (ref 0.1–0.8)
ABSOLUTE MONO #: 1.65 K/UL (ref 0.1–0.8)
ALBUMIN SERPL-MCNC: 3.9 G/DL (ref 3.5–5.2)
ALBUMIN/GLOBULIN RATIO: 1.1 (ref 1–2.5)
ALP BLD-CCNC: 68 U/L (ref 40–129)
ALT SERPL-CCNC: 24 U/L (ref 5–41)
ANION GAP SERPL CALCULATED.3IONS-SCNC: 13 MMOL/L (ref 9–17)
AST SERPL-CCNC: 20 U/L
BASOPHILS # BLD: 0 % (ref 0–2)
BASOPHILS # BLD: 0 % (ref 0–2)
BASOPHILS ABSOLUTE: 0 K/UL (ref 0–0.2)
BASOPHILS ABSOLUTE: 0 K/UL (ref 0–0.2)
BILIRUB SERPL-MCNC: 0.9 MG/DL (ref 0.3–1.2)
BUN BLDV-MCNC: 16 MG/DL (ref 8–23)
C-REACTIVE PROTEIN: 105.7 MG/L (ref 0–5)
CALCIUM SERPL-MCNC: 8.6 MG/DL (ref 8.6–10.4)
CHLORIDE BLD-SCNC: 104 MMOL/L (ref 98–107)
CO2: 21 MMOL/L (ref 20–31)
CREAT SERPL-MCNC: 0.72 MG/DL (ref 0.7–1.2)
D-DIMER QUANTITATIVE: 0.69 MG/L FEU
EOSINOPHILS RELATIVE PERCENT: 0 % (ref 1–4)
EOSINOPHILS RELATIVE PERCENT: 0 % (ref 1–4)
FERRITIN: 274 NG/ML (ref 30–400)
FIBRINOGEN: 376 MG/DL (ref 140–420)
GFR AFRICAN AMERICAN: >60 ML/MIN
GFR NON-AFRICAN AMERICAN: >60 ML/MIN
GFR SERPL CREATININE-BSD FRML MDRD: ABNORMAL ML/MIN/{1.73_M2}
GLUCOSE BLD-MCNC: 193 MG/DL (ref 70–99)
HCT VFR BLD CALC: 44 % (ref 41–53)
HCT VFR BLD CALC: 44.2 % (ref 40.7–50.3)
HEMOGLOBIN: 14.1 G/DL (ref 13–17)
HEMOGLOBIN: 14.5 G/DL (ref 13.5–17.5)
IMMATURE GRANULOCYTES: 3 %
INR BLD: 1.1
LACTATE DEHYDROGENASE: 202 U/L (ref 135–225)
LACTIC ACID, SEPSIS WHOLE BLOOD: 1.8 MMOL/L (ref 0.5–1.9)
LACTIC ACID, WHOLE BLOOD: 2.2 MMOL/L (ref 0.7–2.1)
LYMPHOCYTES # BLD: 10 % (ref 24–44)
LYMPHOCYTES # BLD: 28 % (ref 24–44)
MCH RBC QN AUTO: 28.8 PG (ref 25.2–33.5)
MCH RBC QN AUTO: 29.3 PG (ref 26–34)
MCHC RBC AUTO-ENTMCNC: 31.9 G/DL (ref 28.4–34.8)
MCHC RBC AUTO-ENTMCNC: 32.8 G/DL (ref 31–37)
MCV RBC AUTO: 89.3 FL (ref 80–100)
MCV RBC AUTO: 90.2 FL (ref 82.6–102.9)
MONOCYTES # BLD: 2 % (ref 1–7)
MONOCYTES # BLD: 9 % (ref 1–7)
MORPHOLOGY: ABNORMAL
MORPHOLOGY: NORMAL
NRBC AUTOMATED: 0 PER 100 WBC
PARTIAL THROMBOPLASTIN TIME: 26.7 SEC (ref 20.5–30.5)
PDW BLD-RTO: 14.4 % (ref 11.8–14.4)
PDW BLD-RTO: 14.8 % (ref 12.5–15.4)
PLATELET # BLD: 137 K/UL (ref 138–453)
PLATELET # BLD: 169 K/UL (ref 140–450)
PMV BLD AUTO: 6.7 FL (ref 6–12)
PMV BLD AUTO: 9.3 FL (ref 8.1–13.5)
POTASSIUM SERPL-SCNC: 3.9 MMOL/L (ref 3.7–5.3)
PROTHROMBIN TIME: 10.8 SEC (ref 9.1–12.3)
RBC # BLD: 4.9 M/UL (ref 4.21–5.77)
RBC # BLD: 4.93 M/UL (ref 4.5–5.9)
SEG NEUTROPHILS: 63 % (ref 36–66)
SEG NEUTROPHILS: 85 % (ref 36–66)
SEGMENTED NEUTROPHILS ABSOLUTE COUNT: 11.53 K/UL (ref 1.8–7.7)
SEGMENTED NEUTROPHILS ABSOLUTE COUNT: 16.91 K/UL (ref 1.8–7.7)
SODIUM BLD-SCNC: 138 MMOL/L (ref 135–144)
TOTAL PROTEIN: 7.6 G/DL (ref 6.4–8.3)
TROPONIN, HIGH SENSITIVITY: 12 NG/L (ref 0–22)
VITAMIN D 25-HYDROXY: 23.4 NG/ML
WBC # BLD: 18.3 K/UL (ref 3.5–11)
WBC # BLD: 19.9 K/UL (ref 3.5–11.3)

## 2022-09-21 PROCEDURE — 84484 ASSAY OF TROPONIN QUANT: CPT

## 2022-09-21 PROCEDURE — 86140 C-REACTIVE PROTEIN: CPT

## 2022-09-21 PROCEDURE — 2T015 HOSPITALIST 2ND TOUCH: CPT | Performed by: STUDENT IN AN ORGANIZED HEALTH CARE EDUCATION/TRAINING PROGRAM

## 2022-09-21 PROCEDURE — 85610 PROTHROMBIN TIME: CPT

## 2022-09-21 PROCEDURE — 2580000003 HC RX 258: Performed by: INTERNAL MEDICINE

## 2022-09-21 PROCEDURE — 82306 VITAMIN D 25 HYDROXY: CPT

## 2022-09-21 PROCEDURE — 83615 LACTATE (LD) (LDH) ENZYME: CPT

## 2022-09-21 PROCEDURE — 2500000003 HC RX 250 WO HCPCS: Performed by: NURSE PRACTITIONER

## 2022-09-21 PROCEDURE — 99222 1ST HOSP IP/OBS MODERATE 55: CPT | Performed by: INTERNAL MEDICINE

## 2022-09-21 PROCEDURE — 83605 ASSAY OF LACTIC ACID: CPT

## 2022-09-21 PROCEDURE — 85730 THROMBOPLASTIN TIME PARTIAL: CPT

## 2022-09-21 PROCEDURE — 85025 COMPLETE CBC W/AUTO DIFF WBC: CPT

## 2022-09-21 PROCEDURE — 6360000002 HC RX W HCPCS: Performed by: NURSE PRACTITIONER

## 2022-09-21 PROCEDURE — 85384 FIBRINOGEN ACTIVITY: CPT

## 2022-09-21 PROCEDURE — 2060000000 HC ICU INTERMEDIATE R&B

## 2022-09-21 PROCEDURE — 80053 COMPREHEN METABOLIC PANEL: CPT

## 2022-09-21 PROCEDURE — 99223 1ST HOSP IP/OBS HIGH 75: CPT | Performed by: INTERNAL MEDICINE

## 2022-09-21 PROCEDURE — 85379 FIBRIN DEGRADATION QUANT: CPT

## 2022-09-21 PROCEDURE — 36415 COLL VENOUS BLD VENIPUNCTURE: CPT

## 2022-09-21 PROCEDURE — 82728 ASSAY OF FERRITIN: CPT

## 2022-09-21 PROCEDURE — 2580000003 HC RX 258: Performed by: NURSE PRACTITIONER

## 2022-09-21 RX ORDER — MAGNESIUM SULFATE 1 G/100ML
1000 INJECTION INTRAVENOUS PRN
Status: DISCONTINUED | OUTPATIENT
Start: 2022-09-21 | End: 2022-09-22 | Stop reason: HOSPADM

## 2022-09-21 RX ORDER — POTASSIUM CHLORIDE 7.45 MG/ML
10 INJECTION INTRAVENOUS PRN
Status: DISCONTINUED | OUTPATIENT
Start: 2022-09-21 | End: 2022-09-22 | Stop reason: HOSPADM

## 2022-09-21 RX ORDER — POTASSIUM CHLORIDE 20 MEQ/1
40 TABLET, EXTENDED RELEASE ORAL PRN
Status: DISCONTINUED | OUTPATIENT
Start: 2022-09-21 | End: 2022-09-22 | Stop reason: HOSPADM

## 2022-09-21 RX ORDER — SODIUM CHLORIDE 0.9 % (FLUSH) 0.9 %
5-40 SYRINGE (ML) INJECTION EVERY 12 HOURS SCHEDULED
Status: DISCONTINUED | OUTPATIENT
Start: 2022-09-21 | End: 2022-09-22 | Stop reason: HOSPADM

## 2022-09-21 RX ORDER — ACETAMINOPHEN 325 MG/1
650 TABLET ORAL EVERY 6 HOURS PRN
Status: DISCONTINUED | OUTPATIENT
Start: 2022-09-21 | End: 2022-09-22 | Stop reason: HOSPADM

## 2022-09-21 RX ORDER — SODIUM CHLORIDE 0.9 % (FLUSH) 0.9 %
10 SYRINGE (ML) INJECTION PRN
Status: DISCONTINUED | OUTPATIENT
Start: 2022-09-21 | End: 2022-09-22 | Stop reason: HOSPADM

## 2022-09-21 RX ORDER — DEXAMETHASONE SODIUM PHOSPHATE 10 MG/ML
10 INJECTION INTRAMUSCULAR; INTRAVENOUS EVERY 8 HOURS
Status: DISCONTINUED | OUTPATIENT
Start: 2022-09-21 | End: 2022-09-22 | Stop reason: HOSPADM

## 2022-09-21 RX ORDER — ONDANSETRON 2 MG/ML
4 INJECTION INTRAMUSCULAR; INTRAVENOUS EVERY 6 HOURS PRN
Status: DISCONTINUED | OUTPATIENT
Start: 2022-09-21 | End: 2022-09-22 | Stop reason: HOSPADM

## 2022-09-21 RX ORDER — SODIUM CHLORIDE 9 MG/ML
INJECTION, SOLUTION INTRAVENOUS CONTINUOUS
Status: DISCONTINUED | OUTPATIENT
Start: 2022-09-21 | End: 2022-09-22

## 2022-09-21 RX ORDER — POLYETHYLENE GLYCOL 3350 17 G/17G
17 POWDER, FOR SOLUTION ORAL DAILY PRN
Status: DISCONTINUED | OUTPATIENT
Start: 2022-09-21 | End: 2022-09-22 | Stop reason: HOSPADM

## 2022-09-21 RX ORDER — ENOXAPARIN SODIUM 100 MG/ML
30 INJECTION SUBCUTANEOUS 2 TIMES DAILY
Status: DISCONTINUED | OUTPATIENT
Start: 2022-09-21 | End: 2022-09-22 | Stop reason: HOSPADM

## 2022-09-21 RX ORDER — ACETAMINOPHEN 650 MG/1
650 SUPPOSITORY RECTAL EVERY 6 HOURS PRN
Status: DISCONTINUED | OUTPATIENT
Start: 2022-09-21 | End: 2022-09-22 | Stop reason: HOSPADM

## 2022-09-21 RX ORDER — SODIUM CHLORIDE 9 MG/ML
INJECTION, SOLUTION INTRAVENOUS PRN
Status: DISCONTINUED | OUTPATIENT
Start: 2022-09-21 | End: 2022-09-22 | Stop reason: HOSPADM

## 2022-09-21 RX ORDER — ONDANSETRON 4 MG/1
4 TABLET, ORALLY DISINTEGRATING ORAL EVERY 8 HOURS PRN
Status: DISCONTINUED | OUTPATIENT
Start: 2022-09-21 | End: 2022-09-22 | Stop reason: HOSPADM

## 2022-09-21 RX ADMIN — DEXAMETHASONE SODIUM PHOSPHATE 10 MG: 10 INJECTION INTRAMUSCULAR; INTRAVENOUS at 18:10

## 2022-09-21 RX ADMIN — DEXAMETHASONE SODIUM PHOSPHATE 10 MG: 10 INJECTION INTRAMUSCULAR; INTRAVENOUS at 03:09

## 2022-09-21 RX ADMIN — SODIUM CHLORIDE, PRESERVATIVE FREE 10 ML: 5 INJECTION INTRAVENOUS at 07:45

## 2022-09-21 RX ADMIN — CEFTRIAXONE SODIUM 1000 MG: 10 INJECTION, POWDER, FOR SOLUTION INTRAVENOUS at 16:30

## 2022-09-21 RX ADMIN — ENOXAPARIN SODIUM 30 MG: 100 INJECTION SUBCUTANEOUS at 07:45

## 2022-09-21 RX ADMIN — DEXAMETHASONE SODIUM PHOSPHATE 10 MG: 10 INJECTION INTRAMUSCULAR; INTRAVENOUS at 10:47

## 2022-09-21 RX ADMIN — SODIUM CHLORIDE: 9 INJECTION, SOLUTION INTRAVENOUS at 20:47

## 2022-09-21 RX ADMIN — SODIUM CHLORIDE: 9 INJECTION, SOLUTION INTRAVENOUS at 01:10

## 2022-09-21 RX ADMIN — ENOXAPARIN SODIUM 30 MG: 100 INJECTION SUBCUTANEOUS at 20:49

## 2022-09-21 ASSESSMENT — PAIN SCALES - GENERAL: PAINLEVEL_OUTOF10: 0

## 2022-09-21 NOTE — CONSULTS
[facility] - Initial Consult Note COVID 19 Patient  Today's Date and Time: 9/21/2022, 12:16 PM    Impression :     COVID 19 Confirmed Infection  Unvaccinated patient  Covid tests:  Past positive test: 10/24/2021  Current positive test: 9/20/2022    Recommendations:   Antibiotic treatment:  Patient is currently on Rocephin  Covid Rx:    Remdesivir:   Decadron: Administered 9/21/2022--> 9/24/2020  Actemra: Not indicated  Monoclonal antibodies      Medical Decision Making/Summary/Discussion:9/21/2022     Patient admitted with COVID 19 infection  He is in viral replication phase of the illness  Admitted with acute supraglottitis/epiglottitis with associated moderate to severe airway narrowing  Patient is currently on Rocephin and Decadron with benefit  He is unvaccinated but had one prior Covid infection  Would benefit from Paxlovid, Remdesivir or Monoclonal as he is receiving steroids during viral replication phase. Will discuss availability with pharmacy. Infection Control Recommendations   [place] Precautions  Airborne isolation  Droplet Isolation  Isolate until 9-30-22    Antimicrobial Stewardship Recommendations     Simplification of therapy  Targeted therapy    Coordination of Outpatient Care:   Estimated Length of IV antimicrobials:TBD  Patient will need Midline Catheter Insertion: TBD  Patient will need PICC line Insertion: No  Patient will need: Home IV , [place],  SNF,  LTAC:TBD  Patient will need outpatient wound care:No    Chief complaint/reason for consultation:   Concern for COVID infection      History of Present Illness:   Nelson Yee is a 79y.o.-year-old  male who was initially admitted on 9/20/2022. Patient seen at the request of Dr. Juancarlos French:  Patient is a 80-year-old male who presented through the ER with complaints of pharyngitis, epiglottitis and flulike symptoms.     Patient previously had COVID-19 in October of 2021 and was treated with bamlanivimab/etesevimab. Patient states that he is unvaccinated. Patient originally presented to [address] [place] [address] ED with acute severe epiglottitis/supraglottitis. Patient states that he also developed flulike symptoms on Sunday 9-18-22, including sore scratchy throat, dysphagia, cough, odynophagia, that have been increasingly getting worse over the last few days. He states that \" he cannot eat or drink anything\" over the last 24 hours. Was recommended that the patient transfer to [facility] for high risk airway evaluation. Since presenting to [place] [id number] the patient has had a ENT evaluation. No indication for endoscopic or intervention. Patient was started on IV Decadron and Rocephin with clinical improvement. He was also placed on nasal cannula for oxygen. According to internal medicine, review of systems was extremely limited due to patient's extreme lethargic state. However he denies any chest pain or shortness of breath. He denies any headaches, fevers, chills, nausea, vomiting, diarrhea, body aches. Patient admitted because of concerns with COVID 19.    CURRENT EVALUATION : 9/21/2022    Afebrile  VS stable    Patient states that he is doing better today. He explains that all his symptoms have significantly decreased. He does report a small cough with mild secretions. Patient exhibiting respiratory distress: No    Patient is currently not receiving supplemental oxygen. He is on room air  RR: 18  02 sat: 96       NEWS Score: 0-4 Low risk group; 5-6: Medium risk group; 7 or above: High risk group  Parameters 3 2 1 0 1 2 3   Age    < 72   ? 65   RR ? 8  9-11 12-20  21-24 ? 25   O2 Sats ?  91 92-93 94-95 ? 96      Suppl O2  Yes  No      SBP ? 90  101-110 111-219   ? 220   HR ? 40  41-50 51-90  111-130 ? 131   Consciousness    Alert   Drowsiness, lethargy, or confusion   Temperature ? 35.0 C (95.0 F)  35.1-36.0 C 95.1-96.9 F 36.1-38.0 C 97.0-100.4 F 38.1-39.0 C 100.5-102.3 F ? 39.1 C ? 102.4 F      NEWS Score:  9-21-22: 6 Medium Risk for ICU care    Overall Daily Picture:   Improving    Presence of secondary bacterial Infection:  Yes  Additional antibiotics: Ceftriaxone    Labs, X rays reviewed: 9/21/2022    BUN: 16  Cr: 0.70    WBC: 19.9 (on steroid therapy)  Hb: 14.1  Plat: 137    Absolute Neutrophils: 16.91  Absolute Lymphocytes: 1.99  Neutrophil/Lymphocyte Ratio: 8.5 High Risk Covid    CRP: 105.7  Ferritin:  LDH:     Pro Calcitonin:      Cultures:  Urine:    Blood:  9/20/2022: Negative for 12 hours  Sputum :    Wound:      Strep:  Negative  COVID:  Positive  Influenza:  Negative    9/20/2022:      Chest x-ray: 9-20-22    Impression   No acute cardiopulmonary disease     X-ray of neck soft tissue:    FINDINGS:   Mild degenerative changes are present in C6 and C7. A mild levo scoliotic   curvature is present. No acute fracture, subluxation or prevertebral soft   tissue swelling is present. Lung apices are unremarkable. No subglottic   airway narrowing. Epiglottis appears blunted. CT neck soft tissue:  Impression   Findings of acute supraglottitis/epiglottitis with associated moderate to   severe airway narrowing. No evidence of abscess or enhancing mass to suggest   malignancy. Findings were discussed with Dr. Kan Calixto at 5:08 pm on 9/20/2022. Discussed with patient, RN, CC, IM. I have personally reviewed the past medical history, past surgical history, medications, social history, and family history, and I have updated the database accordingly. Past Medical History:   History reviewed. No pertinent past medical history.     Past Surgical  History:     Past Surgical History:   Procedure Laterality Date    ACHILLES TENDON SURGERY Bilateral        Medications:      sodium chloride flush  5-40 mL IntraVENous 2 times per day    enoxaparin  30 mg SubCUTAneous BID    cefTRIAXone (ROCEPHIN) IV  1,000 mg IntraVENous Q24H    dexamethasone  10 mg IntraVENous Q8H       Social History:     Social History     Socioeconomic History    Marital status:      Spouse name: Not on file    Number of children: Not on file    Years of education: Not on file    Highest education level: Not on file   Occupational History    Not on file   Tobacco Use    Smoking status: Never    Smokeless tobacco: Never   Vaping Use    Vaping Use: Never used   Substance and Sexual Activity    Alcohol use: Yes     Comment: occasionally    Drug use: Never    Sexual activity: Not on file   Other Topics Concern    Not on file   Social History Narrative    Not on file     Social Determinants of Health     Financial Resource Strain: Not on file   Food Insecurity: Not on file   Transportation Needs: Not on file   Physical Activity: Not on file   Stress: Not on file   Social Connections: Not on file   Intimate Partner Violence: Not on file   Housing Stability: Not on file       Family History:   History reviewed. No pertinent family history. Allergies:   Patient has no known allergies. Review of Systems:       Constitutional: No fevers or chills. No systemic complaints  Head: No headaches  Eyes: No double vision or blurry vision. No conjunctival inflammation. ENT: Improvement in: Sore throat. Difficulty swallowing. No runny nose. . No hearing loss, tinnitus or vertigo. Cardiovascular: No chest pain or palpitations. No Shortness of breath. No SCHUMACHER  Lung: No Shortness of breath or cough. No sputum production  Abdomen: No nausea, vomiting, diarrhea, or abdominal pain. [place] No cramps. Genitourinary: No increased urinary frequency, or dysuria. No hematuria. No suprapubic or CVA pain  Musculoskeletal: No muscle aches or pains. No joint effusions, swelling or deformities  Hematologic: No bleeding or bruising. Neurologic: No headache, weakness, numbness, or tingling. Integument: No rash, no ulcers. Psychiatric: No depression.    Endocrine: No polyuria, no polydipsia, no polyphagia. Physical Examination :   Patient Vitals for the past 8 hrs:   BP Temp Temp src Pulse Resp SpO2   09/21/22 0935 -- -- -- -- -- 95 %   09/21/22 0745 122/77 97.5 °F (36.4 °C) Oral 78 18 98 %   09/21/22 0607 -- 97.4 °F (36.3 °C) Oral -- -- --     General Appearance: Awake, alert, and in no apparent distress  Head:  Normocephalic, no trauma  Eyes: Pupils equal, round, reactive to light; sclera anicteric; conjunctivae pink. No embolic phenomena. ENT: Improvement in: Sore throat. Enlarged Tongue. Neck: Supple, without lymphadenopathy. Thyroid normal, No bruits. Pulmonary/Chest: Clear to auscultation, without wheezes, rales, or rhonchi. No dullness to percussion. Cardiovascular: Regular rate and rhythm without murmurs, rubs, or gallops. Abdomen: Soft, non tender. Bowel sounds normal. No organomegaly  All four Extremities: No cyanosis, clubbing, edema, or effusions. Neurologic: No gross sensory or motor deficits. Skin: Warm and dry with good turgor. No signs of peripheral arterial or venous insufficiency. No ulcerations. No open wounds. Medical Decision Making -Laboratory:   I have independently reviewed/ordered the following labs:    CBC with Differential:   Recent Labs     09/20/22  1500 09/21/22  0140   WBC 18.3* 19.9*   HGB 14.5 14.1   HCT 44.0 44.2    137*   LYMPHOPCT 28 10*   MONOPCT 9* 2     BMP:   Recent Labs     09/20/22  1500 09/21/22  0140   * 138   K 3.7 3.9   CL 97* 104   CO2 27 21   BUN 15 16   CREATININE 1.10 0.72     Hepatic Function Panel:   Recent Labs     09/21/22  0140   PROT 7.6   LABALBU 3.9   BILITOT 0.9   ALKPHOS 68   ALT 24   AST 20     No results for input(s): RPR in the last 72 hours. No results for input(s): HIV in the last 72 hours. No results for input(s): BC in the last 72 hours.   Lab Results   Component Value Date/Time    RBC 4.90 09/21/2022 01:40 AM    WBC 19.9 09/21/2022 01:40 AM     Lab Results   Component Value Date/Time    CREATININE 0.72 09/21/2022 01:40 AM    GLUCOSE 193 09/21/2022 01:40 AM       Medical Decision Making-Imaging:     Narrative   EXAMINATION:   TWO XRAY VIEWS OF THE NECK SOFT TISSUES       9/20/2022 2:59 pm       COMPARISON:   None. HISTORY:   ORDERING SYSTEM PROVIDED HISTORY: sore throat   TECHNOLOGIST PROVIDED HISTORY:   sore throat   Reason for Exam: Cough, sore throat       FINDINGS:   Mild degenerative changes are present in C6 and C7. A mild levo scoliotic   curvature is present. No acute fracture, subluxation or prevertebral soft   tissue swelling is present. Lung apices are unremarkable. No subglottic   airway narrowing. Epiglottis appears blunted. Impression   Blunted thumb like epiglottis. Epiglottitis not excluded. RECOMMENDATION:   Advise CT soft tissues neck. Narrative   EXAMINATION:   TWO XRAY VIEWS OF THE CHEST       9/20/2022 11:59 am       COMPARISON:   None. HISTORY:   ORDERING SYSTEM PROVIDED HISTORY: cough   TECHNOLOGIST PROVIDED HISTORY:   cough   Reason for Exam: Cough, sore throat       FINDINGS:   . The cardiac size is normal. No acute infiltrates or pleural effusions are   seen. Pulmonary vascularity appears normal. There is mild ectasia of the   thoracic aorta. There are degenerative changes in the spine . No acute bony   abnormalities. The hilar structures are normal.           Impression   No acute cardiopulmonary disease     Narrative   EXAMINATION:   CT OF THE NECK SOFT TISSUE WITH CONTRAST  9/20/2022       TECHNIQUE:   CT of the neck was performed with the administration of intravenous contrast.   Multiplanar reformatted images are provided for review. Automated exposure   control, iterative reconstruction, and/or weight based adjustment of the   mA/kV was utilized to reduce the radiation dose to as low as reasonably   achievable. COMPARISON:   None.        HISTORY:   ORDERING SYSTEM PROVIDED HISTORY: sore throat   TECHNOLOGIST PROVIDED HISTORY:   sore throat Decision Support Exception - unselect if not a suspected or confirmed   emergency medical condition->Emergency Medical Condition (MA)   Reason for Exam: Sore throat- covid positive, xray suggested CT       FINDINGS:   PHARYNX/LARYNX:  The palatine tonsils are normal in appearance. The tongue   is normal in appearance. The vallecular patent. Supraglottic submucosal   edema causes moderate to severe airway narrowing and involves the right   greater left aryepiglottic folds and right epiglottis. No mass or abscess is   seen. SALIVARY GLANDS/THYROID:  The parotid and submandibular glands appear   unremarkable. The thyroid gland appears unremarkable. LYMPH NODES:  No cervical or supraclavicular lymphadenopathy is seen. SOFT TISSUES:  No soft tissue mass or fluid collection. No acute vascular   abnormality within limits of this non angiographic evaluation. BRAIN/ORBITS/SINUSES:  The visualized portion of the intracranial contents   appear unremarkable. The visualized portion of the orbits, paranasal sinuses   and mastoid air cells demonstrate no acute abnormality. LUNG APICES/SUPERIOR MEDIASTINUM:  No focal consolidation is seen within the   visualized lung apices. No superior mediastinal lymphadenopathy or mass. The visualized portion of the trachea appears unremarkable. BONES:  No acute osseous abnormality. Mild degenerative changes in the   cervical spine greatest at C6-7. Impression   Findings of acute supraglottitis/epiglottitis with associated moderate to   severe airway narrowing. No evidence of abscess or enhancing mass to suggest   malignancy. Findings were discussed with Dr. Brett Daily at 5:08 pm on 9/20/2022.          Medical Decision [id number]:       Medical Decision Making-Other:     Note:  Labs, medications, radiologic studies were reviewed with personal review of films  Moderate Large amounts of data were reviewed  Discussed with nursing Staff, Discharge planner  Infection Control and Prevention measures reviewed  All prior entries were reviewed  Administer medications as ordered  Prognosis: Guarded  Discharge planning reviewed  Follow up as outpatient. Thank you for allowing us to participate in the care of this patient. Please call with questions. Baljit Negro, LITTLEM  Podiatric Medicine & Surgery, PGY-1  [address] [facility] [address], [address] [facility],[address]:    I have discussed the case, including pertinent history and exam findings with the residents and students. I have seen and examined the patient and the key elements of the encounter have been performed by me. I was present when the student obtained his information or examined the patient. I have reviewed the laboratory data, other diagnostic studies and discussed them with the residents. I have updated the medical record where necessary. I agree with the assessment, plan and orders as documented by the resident/ student.     Rufino Lowery MD.

## 2022-09-21 NOTE — H&P
[facility]  Office: [address], DO, Xiangkassidy Rollins, DO, Sukhi Kd, DO, Terrie Powell, DO, Nicolas Arciniega MD, Jaye Quintanilla MD, Arin Vázquez MD, Brad Soto MD,  Ashley Griffin MD, Alfred Chin MD, Morgan Alcazar DO, Delfina Williamson MD,  Jean-Pierre Valdes MD, Alen Plascencia MD, Emiliana Champagne DO, Jitendra Braden MD, Ish Bush MD, Mayi Silverman MD, Ted Vu MD, Radha Daley MD, Ruth Johnson MD, Mateus Craig DO, Kellen Lucia MD, Amara Griffin MD, Romayne Muscat, CNP,  Yimi Mcfarland, CNP, Ernie Jimenez, CNP, Nikolas Montenegro, CNP,  Joseph Tran, DNP, Noe Cuevas, CNP, Seth Mitchell, CNP, Charity Don, CNP, Octavia Ruiz, CNP, Jeanne Underwood, CNP, Nicola Maldonado PA-C, Urszula Glass, CNS, Mariela Henley, DNP, Gabriela Vazquez, CNP, Ayla Nunez, [facility], Oscar Brooks, [facility]    HISTORY AND PHYSICAL EXAMINATION            Date:   9/21/2022  Patient name:  Leo Herbert  Date of admission:  9/20/2022  7:06 PM  MRN:   [id number]  Account:  [de-identified]  YOB: 1955  PCP:    Marcus Cochran MD  Room:   6238/4388-94  Code Status:    Full Code    Chief Complaint:     Chief Complaint   Patient presents with    Pharyngitis     Since Saturday, epiglottitis, denies SOB   \" Because I needed something\"    History Obtained From:     patient    History of Present Illness:     Leo Herbert is a 29-year-old male with history of chronic nasal congestion who presents with Pharyngitis (Since Saturday, epiglottitis, denies SOB)   and is admitted to the hospital for the management of Epiglottitis. And COVID-19 viral syndrome    Patient describes symptoms over the past few days with worsening sore throat with \"scratchy burning\" pain, +dysphagia with both liquids and solids. Denies worsening coughing or choking after eating but admits to poor p.o. intake over the past few days. +odynophagia that is \"severe\". States \"I cannot eat or drink anything\" x24hrs.  +chills with diaphoretic episodes that began abruptly on Sunday. Previously with COVID-19 in 2021 status post treatment bamlanivimab/ etesevimab 10/2021. Patient denies vaccine. He developed flulike symptoms on Sunday with URI symptoms with worsening difficulty swallowing with worsening sore throat over the past 24 to 48 hours. Patient presented with acute severe epiglottitis/supraglottitis initially evaluated [place] [facility] and recommended for transfer with concern for high risk airway.  + Reduced voice difficulty speaking. Status post urgent evaluation by ENT earlier this evening. No indication for endoscopy or intervention. Status post IV Decadron/Rocephin/nasal cannula oxygen with clinical improvement, transfer to [place] for ENT evaluation. Review of systems extremely limited with lethargic state, continues to fall asleep during evaluation. Denies chest pain or difficulty breathing. Feels better but still with severe sore throat. Denies headaches, denies fevers or chills, denies nausea vomiting diarrhea, denies myalgias or arthralgias. Denies near syncope or collapse despite poor p.o. intake over the past few days. Past Medical History:     Chronic nasal congestion  History of COVID 2021  Chronic GERD  Dyslipidemia  Insulin resistance  Obesity    Past Surgical History:     Past Surgical History:   Procedure Laterality Date    ACHILLES TENDON SURGERY Bilateral         Medications Prior to Admission:     Prior to Admission medications    Not on File        Allergies:     Patient has no known allergies. Social History:     Tobacco:    reports that he has never smoked. He has never used smokeless tobacco.  Alcohol:      reports current alcohol use. Drug Use:  reports no history of drug use. Patient denies history of tobacco alcohol or excessive binge drinking or illegal drugs. Denies travel. Denies falls or injuries    Family History:     Mom did have heart disease. Father with liver disease without clear details  Review of Systems:     Positive and Negative as described in HPI. Review of Systems  Extremely limited as patient lethargic, difficult to arouse despite multiple attempts, continues to snore and fall back asleep. Denies shortness of breath. Denies history of MI CHF DVT PE TIA stroke. Denies sleep apnea. Denies urinary symptoms no dysuria hematuria frequency urgency. Denies easy bruising or bleeding issues. Denies rectal bleeding or blood per rectum. Denies history of recurrent bronchitis or pneumonias asthma or other respiratory issues. Denies recent travel. Does have chronic venous insufficiency from prior remote ankle surgery, compliant with stockings. Denies worsening lower extremity edema. Denies hemoptysis. Denies heart palpitations irregular heartbeat or tachycardia. Denies nausea vomiting abdominal pain, indigestion heartburn, diarrhea or constipation. Does struggle with chronic nasal congestion, denies sleep apnea. Status post prior ENT evaluation    Physical Exam:   /71   Pulse 59   Temp 97.4 °F (36.3 °C) (Oral)   Resp 20   Ht 6' 9\" (2.057 m)   Wt 284 lb 6.3 oz (129 kg)   SpO2 99%   BMI 30.48 kg/m²   Temp (24hrs), Av.2 °F (36.8 °C), Min:96.1 °F (35.6 °C), Max:100.4 °F (38 °C)    No results for input(s): POCGLU in the last 72 hours.   No intake or output data in the 24 hours ending 22 0744    Physical Exam  General Sleepy but arouses easily, falls asleep during evaluation, acutely diaphoretic with beads of sweat on chest and scalp and arms, oriented upon awakening  Eye exam pupils equally round reactive sclera nonicteric normal horizontal gaze  ENT oropharynx with limited visualization with poor cooperation secondary to pain dry mucous membranes, adequate dentition , face grossly symmetric neck supple  Cardiovascular regular rate and rhythm normal S1-S2 no rubs or gallops no JVD, no murmurs rubs or gallops  Lungs adequate air exchange difficult to evaluate as patient continues to snore and fall asleep back during evaluation, diminished at bases with mild shallow resting tachypnea, ?   Faint inspiratory stridor intermittently, bronchial breath sounds  GI soft nontender nondistended bowel sounds present obese  Vascular chronic venous stasis with stasis dermatitis with intact dorsalis pedis posterior tibialis  Derm adequate foot hygiene, no rashes lesions or skin infections  Neuro nonfocal groggy sensorium, strength symmetric in upper and lower limbs sensation grossly intact  Musculoskeletal passive range of motion of upper and lower limbs unremarkable no synovitis or joint effusions    Investigations:      Laboratory Testing:  Recent Results (from the past 24 hour(s))   CBC with Auto Differential    Collection Time: 09/20/22  3:00 PM   Result Value Ref Range    WBC 18.3 (H) 3.5 - 11.0 k/uL    RBC 4.93 4.5 - 5.9 m/uL    Hemoglobin 14.5 13.5 - 17.5 g/dL    Hematocrit 44.0 41 - 53 %    MCV 89.3 80 - 100 fL    MCH 29.3 26 - 34 pg    MCHC 32.8 31 - 37 g/dL    RDW 14.8 12.5 - 15.4 %    Platelets 991 279 - 228 k/uL    MPV 6.7 6.0 - 12.0 fL    Seg Neutrophils 63 36 - 66 %    Lymphocytes 28 24 - 44 %    Monocytes 9 (H) 1 - 7 %    Eosinophils % 0 (L) 1 - 4 %    Basophils 0 0 - 2 %    Segs Absolute 11.53 (H) 1.8 - 7.7 k/uL    Absolute Lymph # 5.12 (H) 1.0 - 4.8 k/uL    Absolute Mono # 1.65 (H) 0.1 - 0.8 k/uL    Absolute Eos # 0.00 0.0 - 0.4 k/uL    Basophils Absolute 0.00 0.0 - 0.2 k/uL    Morphology ANISOCYTOSIS PRESENT     Morphology INCREASED BANDS PRESENT     Morphology 1+ ELLIPTOCYTES    Basic Metabolic Panel    Collection Time: 09/20/22  3:00 PM   Result Value Ref Range    Glucose 139 (H) 70 - 99 mg/dL    BUN 15 8 - 23 mg/dL    Creatinine 1.10 0.70 - 1.20 mg/dL    Calcium 9.2 8.6 - 10.4 mg/dL    Sodium 134 (L) 135 - 144 mmol/L    Potassium 3.7 3.7 - 5.3 mmol/L Chloride 97 (L) 98 - 107 mmol/L    CO2 27 20 - 31 mmol/L    Anion Gap 10 9 - 17 mmol/L    GFR Non-African American >60 >60 mL/min    GFR African American >60 >60 mL/min    GFR Comment         Strep Screen Group A Throat    Collection Time: 09/20/22  3:00 PM    Specimen: Throat   Result Value Ref Range    Source . THROAT SWAB     Strep A Ag NEGATIVE NEGATIVE   COVID-19, Rapid    Collection Time: 09/20/22  3:00 PM    Specimen: Nasopharyngeal Swab   Result Value Ref Range    Specimen Description . NASOPHARYNGEAL SWAB     SARS-CoV-2, Rapid DETECTED (A) Not Detected   Rapid Influenza A/B Antigens    Collection Time: 09/20/22  3:00 PM    Specimen: Nasopharyngeal   Result Value Ref Range    Flu A Antigen NEGATIVE NEGATIVE    Flu B Antigen NEGATIVE NEGATIVE   Culture, Blood 1    Collection Time: 09/20/22  5:20 PM    Specimen: Blood   Result Value Ref Range    Specimen Description . BLOOD     Special Requests LEFT FOREARM 19CC     Culture NO GROWTH <24 HRS    Lactic Acid    Collection Time: 09/20/22  5:20 PM   Result Value Ref Range    Lactic Acid 1.2 0.5 - 2.2 mmol/L   Culture, Blood 1    Collection Time: 09/20/22  5:32 PM    Specimen: Blood   Result Value Ref Range    Specimen Description . BLOOD     Special Requests RIGHT FOREARM 20CC     Culture NO GROWTH <24 HRS    Vitamin D 25 Hydroxy    Collection Time: 09/21/22  1:40 AM   Result Value Ref Range    Vit D, 25-Hydroxy 23.4 (L) >29.9 ng/mL   CBC with Auto Differential    Collection Time: 09/21/22  1:40 AM   Result Value Ref Range    WBC 19.9 (H) 3.5 - 11.3 k/uL    RBC 4.90 4.21 - 5.77 m/uL    Hemoglobin 14.1 13.0 - 17.0 g/dL    Hematocrit 44.2 40.7 - 50.3 %    MCV 90.2 82.6 - 102.9 fL    MCH 28.8 25.2 - 33.5 pg    MCHC 31.9 28.4 - 34.8 g/dL    RDW 14.4 11.8 - 14.4 %    Platelets 866 (L) 964 - 453 k/uL    MPV 9.3 8.1 - 13.5 fL    NRBC Automated 0.0 0.0 per 100 WBC    Immature Granulocytes 3 (H) 0 %    Seg Neutrophils 85 (H) 36 - 66 %    Lymphocytes 10 (L) 24 - 44 % Monocytes 2 1 - 7 %    Eosinophils % 0 (L) 1 - 4 %    Basophils 0 0 - 2 %    Absolute Immature Granulocyte 0.60 (H) 0.00 - 0.30 k/uL    Segs Absolute 16.91 (H) 1.8 - 7.7 k/uL    Absolute Lymph # 1.99 1.0 - 4.8 k/uL    Absolute Mono # 0.40 0.1 - 0.8 k/uL    Absolute Eos # 0.00 0.0 - 0.4 k/uL    Basophils Absolute 0.00 0.0 - 0.2 k/uL    Morphology Normal    Comprehensive Metabolic Panel w/ Reflex to MG    Collection Time: 09/21/22  1:40 AM   Result Value Ref Range    Glucose 193 (H) 70 - 99 mg/dL    BUN 16 8 - 23 mg/dL    Creatinine 0.72 0.70 - 1.20 mg/dL    Calcium 8.6 8.6 - 10.4 mg/dL    Sodium 138 135 - 144 mmol/L    Potassium 3.9 3.7 - 5.3 mmol/L    Chloride 104 98 - 107 mmol/L    CO2 21 20 - 31 mmol/L    Anion Gap 13 9 - 17 mmol/L    Alkaline Phosphatase 68 40 - 129 U/L    ALT 24 5 - 41 U/L    AST 20 <40 U/L    Total Bilirubin 0.9 0.3 - 1.2 mg/dL    Total Protein 7.6 6.4 - 8.3 g/dL    Albumin 3.9 3.5 - 5.2 g/dL    Albumin/Globulin Ratio 1.1 1.0 - 2.5    GFR Non-African American >60 >60 mL/min    GFR African American >60 >60 mL/min    GFR Comment         Protime-INR    Collection Time: 09/21/22  1:40 AM   Result Value Ref Range    Protime 10.8 9.1 - 12.3 sec    INR 1.1    APTT    Collection Time: 09/21/22  1:40 AM   Result Value Ref Range    PTT 26.7 20.5 - 30.5 sec   Fibrinogen    Collection Time: 09/21/22  1:40 AM   Result Value Ref Range    Fibrinogen 376 140 - 420 mg/dL   C-Reactive Protein    Collection Time: 09/21/22  1:40 AM   Result Value Ref Range    .7 (H) 0.0 - 5.0 mg/L   D-Dimer, Quantitative    Collection Time: 09/21/22  1:40 AM   Result Value Ref Range    D-Dimer, Quant 0.69 mg/L FEU   Troponin    Collection Time: 09/21/22  1:40 AM   Result Value Ref Range    Troponin, High Sensitivity 12 0 - 22 ng/L   Lactic Acid    Collection Time: 09/21/22  1:40 AM   Result Value Ref Range    Lactic Acid, Whole Blood 2.2 (H) 0.7 - 2.1 mmol/L   Ferritin    Collection Time: 09/21/22  1:40 AM   Result Value Ref Range    Ferritin 274 30 - 400 ng/mL   Lactate Dehydrogenase    Collection Time: 09/21/22  1:40 AM   Result Value Ref Range     135 - 225 U/L       Imaging/Diagnostics:  XR NECK SOFT TISSUE    Result Date: 9/20/2022  Blunted thumb like epiglottis. Epiglottitis not excluded. RECOMMENDATION: Advise CT soft tissues neck. XR CHEST (2 VW)    Result Date: 9/20/2022  No acute cardiopulmonary disease     CT SOFT TISSUE NECK W CONTRAST    Result Date: 9/20/2022  Findings of acute supraglottitis/epiglottitis with associated moderate to severe airway narrowing. No evidence of abscess or enhancing mass to suggest malignancy. Findings were discussed with Dr. Pura Stearns at 5:08 pm on 9/20/2022. Assessment :      Hospital Problems             Last Modified POA    * (Principal) Epiglottitis 9/21/2022 Yes    Vitamin D deficiency 9/21/2022 Yes    Acute hyponatremia 9/21/2022 Yes    Diaphoresis 9/21/2022 Yes    COVID-19 9/21/2022 Yes    Leukemoid reaction 9/21/2022 Yes    Thrombocytopenia ([address] 9/21/2022 Yes    Hyperglycemia 9/21/2022 Yes    Lactic acidosis 9/21/2022 Yes       Plan:     Patient status inpatient in the Progressive Unit/Step down    Acute severe epiglottitis/supraglottitis with moderate to severe airway narrowing/high risk airway. Continue monitor closely with consideration for transfer to ICU for worsening respiratory symptoms. Follow closely with ENT. Likely related to viral illness with acute COVID-19. Continue supportive care. N.p.o. for now, consider further adjustment as symptoms improve  Acute COVID-19 with acute hypoxic respiratory failure. Not vaccinated but previously infected in 2021. Continued on Decadron with above. Continue to monitor for worsening acute hypoxic respiratory failure. Wean FiO2 as tolerated. Chest x-ray reviewed independently by myself, no evidence of pneumonitis on imaging  Acute lactic acidosis likely related to acute illness, respiratory symptoms.   Monitor closely status post IV fluids  Acute dysphagia/ odynophasia with anorexia. Continue n.p.o. for now gentle IV fluids. suspect acute borderline hyponatremia likely related to dehydration. Continue to monitor electrolytes  Thrombocytopenia/leukocytosis likely related to acute viral illness/steroids. Continue to monitor. Continue empiric antibiotics pending culture results. Status post evaluation by critical care with concern for severe airway compromise, acute hypoxic respiratory failure. Remains on nasal cannula. Monitor closely with consideration for transfer to ICU if worsening respiratory symptoms. Status post ENT evaluation recommending conservative treatment. Question concerns and treatment plan discussed with patient. Discussed with nursing    Outside records from PCPs office, cardiology reviewed in the [address] [place] [address] system. Chest x-ray imaging and CT imaging reviewed independently by myself. Anticipate likely discharge in 3 to 5 days contingent on clinical progress      Consultations:   [address]  IP CONSULT TO INTERNAL MEDICINE     Patient is admitted as inpatient status because of co-morbidities listed above, severity of signs and symptoms as outlined, requirement for current medical therapies and most importantly because of direct risk to patient if care not provided in a hospital setting. Expected length of stay > 48 hours.     Naila Wright MD  9/21/2022  7:44 AM    Copy sent to Dr. Mehnaz Nguyen MD

## 2022-09-21 NOTE — PLAN OF CARE
Problem: Discharge Planning  Goal: Discharge to home or other facility with appropriate resources  Outcome: Progressing     Problem: Safety - Adult  Goal: Free from fall injury  Outcome: Progressing  Flowsheets (Taken 9/21/2022 1796)  Free From Fall Injury: Instruct family/caregiver on patient safety     Problem: Pain  Goal: Verbalizes/displays adequate comfort level or baseline comfort level  Outcome: Progressing

## 2022-09-21 NOTE — CONSULTS
CONSULTING SERVICE: Otolaryngology-Head and Neck Surgery    REASON FOR CONSULT:  Meera Connolly is a 79 y.o. male seen today for   Chief Complaint   Patient presents with    Pharyngitis     Since Saturday, epiglottitis, denies SOB       HISTORY OF PRESENT ILLNESS:   Patient transferred from [facility]  Woke up this morning with acute onset of voice change, dysphagia   Otherwise healthy    COVID+  on admission to outside ED though was negative on home testing 48 hours prior      Supraglottitis/epiglottitis on CT neck     Received Rocephin and steroid at outside ED     He is feeling \"100% better\" corroborated by his son and wife who are at bedside since transfer to [facility]     No stridor. No airway distress  Conversing in full sentences  Voice is back to normal per family    REVIEW OF SYSTEMS:   General ROS: negative  Psychological ROS: negative  Ophthalmic ROS: negative  ENT ROS: negative  Allergy and Immunology ROS: negative  Hematological and Lymphatic ROS: negative  Endocrine ROS: negative  Respiratory ROS: no cough, shortness of breath, wheezing  Cardiovascular ROS: no chest pain or dyspnea on exertion  Gastrointestinal ROS: \"no abdominal pain, diarrhea, tarry stools, change in bowel habit  Musculoskeletal ROS: negative    PAST HISTORY:   History reviewed. No pertinent past medical history. Past Surgical History:   Procedure Laterality Date    ACHILLES TENDON SURGERY Bilateral      History reviewed. No pertinent family history. Social Connections: Not on file        MEDICATIONS:   No current facility-administered medications for this encounter. No current outpatient medications on file.         ALLERGIES:   No Known Allergies     PHYSICAL EXAM:  Vitals:    09/20/22 2100 09/20/22 2139 09/20/22 2201 09/20/22 2224   BP:   119/80    Pulse: 75 60 61 57   Resp:    13   Temp:       TempSrc:       SpO2: 98% 99% 98% 97%   Weight:          GENERAL: well developed and well nourished and in no acute distress  HEAD: normocephalic and atraumatic  EYES: no eyelid swelling, no conjunctival injection or exudate, pupils equal round and reactive to light  EXTERNAL EARS: normal  EAR EXAM: normal TMs bilaterally and normal canals bilaterally  NOSE: nares patent, normal mucosa  MOUTH/THROAT: mucous membranes moist, no focal lesions, no tonsillar enlargement or exudate  NECK: non-tender, full range of motion, no mass, no focal lymphadenopathy  RESPIRATORY: Normal expansion. Clear to auscultation. No rales, rhonchi, or wheezing. NEUROLOGICAL:  cranial nerves II-XII are grossly intact    RELEVANT LABS/STUDIES:  CT neck:  Supraglottic submucosal edema causes moderate to severe airway narrowing and involves the right greater left aryepiglottic folds and right epiglottis. No mass or abscess is  seen.         IMPRESSION:  Supraglottitis/epiglottis in context of COVID +    RECOMMENDATIONS/PLAN:  No indication for endoscopy or intervention at this point  Will observe in step-down unit (ICU full)     Call with any change in status  --------------------------------------------------  Antonia Gardner MD  Pediatric Otolaryngology-Head and Neck [address] [facility]    Office  ph# [phone]    Also available in [facility]

## 2022-09-21 NOTE — CONSULTS
Otolaryngology staff note  Brief consult note- full note to follow in AM    [facility]   Supraglottitis/epiglottitis on CT neck    Received Rocephin and steroid at outside ED    He is feeling \"100% better\" corroborated by his son and wife who are at bedside    No stridor.  No airway distress  Conversing in full sentences  Voice is back to normal per family    No indication for endoscopy or intervention at this point  Will observe in step-down unit (ICU full)    Call with any change in status    --------------------------------------------------  Mary Ellen Newton MD  Pediatric Otolaryngology-Head and Neck Surgery  [address] Otolaryngology group    Office  ph# [phone]    Also available in [address] [place] [address]

## 2022-09-21 NOTE — PROGRESS NOTES
Physician Progress Note      PATIENT:               Janie MISTRY  CSN #:                  [id number]  :                       1955  ADMIT DATE:       2022 7:06 PM  [address] [place] DATE:  Shaan Hogan  PROVIDER #:        Miar García DO          QUERY TEXT:    Patient admitted with COVID-19/epiglottitis/supraglottitis. Noted   documentation of acute respiratory failure in H&P on . In order to support   the diagnosis of acute respiratory failure, please include additional   clinical indicators in your documentation. Or please document if the   diagnosis of acute respiratory failure has been ruled out after further study. The medical record reflects the following:  Risk Factors: COVID-19/epiglottitis/supraglottitis  Clinical Indicators: per H&P \"Acute severe epiglottitis/supraglottitis with   moderate to severe airway narrowing\", per ED notes \"Patient speaking in full   sentences without stridor or respiratory distress. He is protecting his airway   well\", minimum O2 sat 97 on 2L NC, noted sats of 94-98 on RA prior to   transfer. no ABG  Treatment: IV Decadron and Rocephin, CT, supplemental O2, ENT   consult-conservative tx    Acute Respiratory Failure Clinical Indicators per [employer] Training Guide and   Quick Reference Guide:  pO2 < 60 mmHg or SpO2 (pulse oximetry) < 91% breathing room air  pCO2 > 50 and pH < 7.35  P/F ratio (pO2 / FIO2) < 300  pO2 decrease or pCO2 increase by 10 mmHg from baseline (if known)  Supplemental oxygen of 40% or more  Presence of respiratory distress, tachypnea, dyspnea, shortness of breath,   wheezing  Unable to speak in complete sentences  Use of accessory muscles to breathe  Extreme anxiety and feeling of impending doom  Tripod position  Confusion/altered mental status/obtunded    Thank you, Juan J Cosby, ADAN  email - [email or web address]  cell- [phone]  office hours M--7A-3P  Options provided:  -- Acute Respiratory Failure as evidenced by, Please document

## 2022-09-21 NOTE — PROGRESS NOTES
18; Notified DEV Fields of the patient's temperature of 96.1 axillary. Unable to successfully obtain an oral reading at this time after multiple attempts. 0413; no new orders noted. A warm blanket was applied.

## 2022-09-21 NOTE — PROGRESS NOTES
CLINICAL PHARMACY NOTE: MEDS TO BEDS    Total # of Prescriptions Filled: 1   The following medications were delivered to the patient:  PAXLOVID     Additional Documentation:     PICKED UP .

## 2022-09-21 NOTE — ED PROVIDER NOTES
[facility] ED     Emergency Department     Faculty Attestation        I performed a history and physical examination of the patient and discussed management with the resident. I reviewed the residents note and agree with the documented findings and plan of care. Any areas of disagreement are noted on the chart. I was personally present for the key portions of any procedures. I have documented in the chart those procedures where I was not present during the key portions. I have reviewed the emergency nurses triage note. I agree with the chief complaint, past medical history, past surgical history, allergies, medications, social and family history as documented unless otherwise noted below. For mid-level providers such as nurse practitioners as well as physicians assistants:    I have personally seen and evaluated the patient. I find the patient's history and physical exam are consistent with NP/PA documentation. I agree with the care provided, treatment rendered, disposition, & follow-up plan. Additional findings are as noted. Vital Signs: /79   Pulse 72   Temp 98.4 °F (36.9 °C) (Oral)   Resp 16   Wt 275 lb (124.7 kg)   SpO2 98%   BMI 29.47 kg/m²   PCP:  Yisel Castrejon MD    Pertinent Comments:     Patient transferred from  in [facility] and complains of sore throat had a work-up there which showed evidence of epiglottitis on CT. He states he feels \"remarkably better\" since being transferred [facility] states he can \"talk normally now. He is handling secretions he does have a slightly muffled voice.   Will discuss with ENT, ICU admission      Critical Care  None          Axel Del Valle MD    Attending Emergency Medicine Physician            Surya Mccullough MD  09/20/22 2032

## 2022-09-21 NOTE — CARE COORDINATION
09/21/22 1556   Service Assessment   Patient Orientation Alert and Oriented;Person;Place;Situation   Cognition Alert   History Provided By Patient   Primary Caregiver Self   Accompanied By/Relationship no one   Support Systems Spouse/Significant Other;Children   Patient's Healthcare Decision Maker is: Legal Next of Kin   PCP Verified by CM Yes  Radha Smith)   Last Visit to PCP Within last year   Prior Functional Level Independent in ADLs/IADLs   Current Functional Level Independent in ADLs/IADLs   Can patient return to prior living arrangement Yes   Ability to make needs known: Good   Family able to assist with home care needs: Yes   Would you like for me to discuss the discharge plan with any other family members/significant others, and if so, who? Yes  (Wife Blanca Carbajal)   Financial Resources Medicare   Social/Functional History   Lives With Spouse   Type of [address]   (4th floor condo)   Home Access Stairs to enter without rails   Bathroom Shower/Tub Tub/Shower unit;Doors   Bathroom Toilet Handicap height   Bathroom Equipment Grab bars in shower;Grab bars around toilet   [id number] [facility] Help From Family   ADL Assistance Independent   Ambulation Assistance Independent   Transfer Assistance Independent   Active  Yes   Mode of Transportation Truck   Education chiropractor   Occupation Full time employment   Type of Occupation chiropractor   Discharge Planning   Type of Residence   (Condo)   Living Arrangements Spouse/Significant Other   Current Services Prior To Admission None   Potential Assistance Needed N/A   DME Ordered? No   Potential Assistance Purchasing Medications No   Type of Home Care Services None   Patient expects to be discharged to:   (Condo)   One/Two Story Residence   (4th floor condo)   History of falls?  0   Services At/After Discharge   Transition of Care Consult (CM Consult) Discharge Planning   Services At/After Discharge None   Mode of Transport at Discharge   (one of his kids to transport)   Condition of Participation: Discharge Planning   The Plan for Transition of Care is related to the following treatment goals: no sore throat   The Patient and/or Patient Representative was provided with a Choice of Provider? Patient   The Patient and/Or Patient Representative agree with the Discharge Plan? Yes   Freedom of Choice list was provided with basic dialogue that supports the patient's individualized plan of care/goals, treatment preferences, and shares the quality data associated with the providers? Yes   Transitional planning-talked with patient on the phone-COVID+, plan is to go home with his wife. Has ride. Verified address, emergency contact, and insurance.

## 2022-09-21 NOTE — CONSULTS
Otolaryngology staff note    HD 1 after admission for supraglottitis/epiglottitis    Patient was seen and examined on rounds this morning  Tolerating PO intake  Doing well. On room air.   Speaking in full sentences  No stridor    Will continue to monitor    --------------------------------------------------  Fermin Elizondo MD  Pediatric Otolaryngology-Head and Neck Surgery  [address] [place] [address] Otolaryngology group    Office  ph# [phone]    Also available in [address] [place] [address]

## 2022-09-21 NOTE — PLAN OF CARE
Critical care was consulted for MICU admission For close monitoring of airway considering epiglottitis. Patient has received antibiotics and steroids and reports feeling much better at this point. Patient is resting comfortably, unlabored breathing. No stridor appreciated. Recommended admission to stepdown unit at this point with close monitoring due to tight bed situation in ICU. Talked to the ER resident who agreed with stepdown admission. Critical care will be available to reassess if needed.

## 2022-09-22 VITALS
BODY MASS INDEX: 32.9 KG/M2 | HEART RATE: 63 BPM | DIASTOLIC BLOOD PRESSURE: 95 MMHG | TEMPERATURE: 97.5 F | SYSTOLIC BLOOD PRESSURE: 127 MMHG | HEIGHT: 78 IN | OXYGEN SATURATION: 97 % | WEIGHT: 284.39 LBS | RESPIRATION RATE: 20 BRPM

## 2022-09-22 PROBLEM — R61 DIAPHORESIS: Status: RESOLVED | Noted: 2022-09-21 | Resolved: 2022-09-22

## 2022-09-22 PROBLEM — J05.10 EPIGLOTTITIS: Status: RESOLVED | Noted: 2022-09-20 | Resolved: 2022-09-22

## 2022-09-22 PROBLEM — E87.1 ACUTE HYPONATREMIA: Status: RESOLVED | Noted: 2022-09-21 | Resolved: 2022-09-22

## 2022-09-22 PROBLEM — J98.8 AIRWAY COMPROMISE: Status: RESOLVED | Noted: 2022-09-21 | Resolved: 2022-09-22

## 2022-09-22 PROBLEM — E87.20 LACTIC ACIDOSIS: Status: RESOLVED | Noted: 2022-09-21 | Resolved: 2022-09-22

## 2022-09-22 LAB
C-REACTIVE PROTEIN: 91.7 MG/L (ref 0–5)
D-DIMER QUANTITATIVE: 0.39 MG/L FEU

## 2022-09-22 PROCEDURE — 85379 FIBRIN DEGRADATION QUANT: CPT

## 2022-09-22 PROCEDURE — 99239 HOSP IP/OBS DSCHRG MGMT >30: CPT | Performed by: NURSE PRACTITIONER

## 2022-09-22 PROCEDURE — 2580000003 HC RX 258: Performed by: NURSE PRACTITIONER

## 2022-09-22 PROCEDURE — 99232 SBSQ HOSP IP/OBS MODERATE 35: CPT | Performed by: INTERNAL MEDICINE

## 2022-09-22 PROCEDURE — 86140 C-REACTIVE PROTEIN: CPT

## 2022-09-22 PROCEDURE — 36415 COLL VENOUS BLD VENIPUNCTURE: CPT

## 2022-09-22 PROCEDURE — 6360000002 HC RX W HCPCS: Performed by: NURSE PRACTITIONER

## 2022-09-22 RX ORDER — PREDNISONE 20 MG/1
TABLET ORAL
Qty: 9 TABLET | Refills: 0 | Status: SHIPPED | OUTPATIENT
Start: 2022-09-22

## 2022-09-22 RX ORDER — CEPHALEXIN 500 MG/1
500 CAPSULE ORAL 4 TIMES DAILY
Qty: 24 CAPSULE | Refills: 0 | Status: SHIPPED | OUTPATIENT
Start: 2022-09-22 | End: 2022-09-28

## 2022-09-22 RX ADMIN — ENOXAPARIN SODIUM 30 MG: 100 INJECTION SUBCUTANEOUS at 08:54

## 2022-09-22 RX ADMIN — SODIUM CHLORIDE, PRESERVATIVE FREE 10 ML: 5 INJECTION INTRAVENOUS at 08:54

## 2022-09-22 RX ADMIN — DEXAMETHASONE SODIUM PHOSPHATE 10 MG: 10 INJECTION INTRAMUSCULAR; INTRAVENOUS at 05:03

## 2022-09-22 NOTE — FLOWSHEET NOTE
Patient given all discharge paperwork/instructions. All questions/concerns addressed. Patient calling for ride.

## 2022-09-22 NOTE — PROGRESS NOTES
[facility]  Office: [address], DO, Jojo Pataskala, DO, Sophy Beets, DO, Scottie Colón Blood, DO, Lynne Boudreaux MD, Adalberto Jones MD, Johnnie Claude, MD, Ana Lowery MD,  Don Wyatt MD, Truett Burkitt, MD, Kelsey Decker, DO, Vera Eagle MD,  Neo Ko, DO, Raj Haley MD, Mart Brower MD, Kellee Armando, DO, Neha Diaz MD, Jacki Lee MD, Albina House MD, Cynthia Womack MD, Shefali Hunter MD, Yaritza Linares MD, Eyad Allison, DO, Valdemar Alcala MD, Mini Mendoza MD, Juan Rider, CNP,  Naya Delarosa, CNP, Rubi Jacques, CNP, Robert Norris, CNP,  Nate Bach, [facility], Marylen Net, CNP, Hardy Rao, CNP, Eloina Dumont, CNP, Ev Ames, CNP, Messi Munoz, CNP, ANN MARIE AsencioC, Mesha Ocampo, CNS, Shay Yu, [facility], Vladimir Robbins, CNP, Rajesh Hollins, CNP, Lester Fernandez, [address] [place] [address]    Progress Note    9/22/2022    11:13 AM    Name:   Gale Pichardo  MRN:     [id number]     Acct:      [de-identified]   Room:   [address] [place] [address] Day:  2  Admit Date:  9/20/2022  7:06 PM    PCP:   Gabriela Ospina MD  Code Status:  Full Code    Subjective:     C/C:   Chief Complaint   Patient presents with    Pharyngitis     Since Saturday, epiglottitis, denies SOB     Interval History Status: improved. Patient evaluated in room sitting in bed in no acute distress. Airway is patent no evidence of stridor, no supplemental O2. Tolerating clear liquid diet without difficulty. Speech is clear and coherent    Brief History:     80-year-old male with a 1 week history of worsening nasal congestion, pharyngitis, and admitted for possible epiglottitis glottitis. Status post emergent eval by ENT without indication for endoscopy or intervention.   Started on IV Decadron, Rocephin and admitted for further work-up and management    Review of Systems:     Constitutional:  negative for chills, fevers, sweats  Respiratory:  negative for cough, dyspnea on exertion, shortness of breath, wheezing  Cardiovascular:  negative for chest pain, chest pressure/discomfort, lower extremity edema, palpitations  Gastrointestinal:  negative for abdominal pain, constipation, diarrhea, nausea, vomiting  Neurological:  negative for dizziness, headache    Medications: Allergies:  No Known Allergies    Current Meds:   Scheduled Meds:    sodium chloride flush  5-40 mL IntraVENous 2 times per day    enoxaparin  30 mg SubCUTAneous BID    cefTRIAXone (ROCEPHIN) IV  1,000 mg IntraVENous Q24H    dexamethasone  10 mg IntraVENous Q8H    nirmatrelvir/ritonavir  3 tablet Oral Q12H     Continuous Infusions:    sodium chloride       PRN Meds: sodium chloride flush, sodium chloride, potassium chloride **OR** potassium alternative oral replacement **OR** potassium chloride, magnesium sulfate, ondansetron **OR** ondansetron, polyethylene glycol, acetaminophen **OR** acetaminophen    Data:     Past Medical History:   has no past medical history on file. Social History:   reports that he has never smoked. He has never used smokeless tobacco. He reports current alcohol use. He reports that he does not use drugs. Family History: History reviewed. No pertinent family history. Vitals:  BP (!) 130/93   Pulse 63   Temp 97.7 °F (36.5 °C) (Oral)   Resp 18   Ht 6' 9\" (2.057 m)   Wt 284 lb 6.3 oz (129 kg)   SpO2 95%   BMI 30.48 kg/m²   Temp (24hrs), Av °F (36.7 °C), Min:97.7 °F (36.5 °C), Max:98.6 °F (37 °C)    No results for input(s): POCGLU in the last 72 hours. I/O (24Hr):   No intake or output data in the 24 hours ending 22 1113    Labs:  Hematology:  Recent Labs     22  1500 22  0140 22  0656   WBC 18.3* 19.9*  --    RBC 4.93 4.90  --    HGB 14.5 14.1  --    HCT 44.0 44.2  --    MCV 89.3 90.2  --    MCH 29.3 28.8  --    MCHC 32.8 31.9  --    RDW 14.8 14.4  --     137*  --    MPV 6.7 9.3  --    CRP --  105.7* 91.7*   INR  --  1.1  --    DDIMER  --  0.69 0.39     Chemistry:  Recent Labs     09/20/22  1500 09/21/22  0140   * 138   K 3.7 3.9   CL 97* 104   CO2 27 21   GLUCOSE 139* 193*   BUN 15 16   CREATININE 1.10 0.72   ANIONGAP 10 13   LABGLOM >60 >60   GFRAA >60 >60   CALCIUM 9.2 8.6   TROPHS  --  12   LACTACIDWB  --  2.2*     Recent Labs     09/21/22  0140   PROT 7.6   LABALBU 3.9   AST 20   ALT 24      ALKPHOS 68   BILITOT 0.9     ABG:No results found for: POCPH, PHART, PH, POCPCO2, VUA5DFY, PCO2, POCPO2, PO2ART, PO2, POCHCO3, BOO2UNJ, HCO3, NBEA, PBEA, BEART, BE, THGBART, THB, MED0AFJ, GVHV7HHY, J5YHXIFB, O2SAT, FIO2  Lab Results   Component Value Date/Time    [facility] 09/20/2022 05:32 PM     Lab Results   Component Value Date/Time    CULTURE NO GROWTH 1 DAY 09/20/2022 05:32 PM       Radiology:  XR NECK SOFT TISSUE    Result Date: 9/20/2022  Blunted thumb like epiglottis. Epiglottitis not excluded. RECOMMENDATION: Advise CT soft tissues neck. XR CHEST (2 VW)    Result Date: 9/20/2022  No acute cardiopulmonary disease     CT SOFT TISSUE NECK W CONTRAST    Result Date: 9/20/2022  Findings of acute supraglottitis/epiglottitis with associated moderate to severe airway narrowing. No evidence of abscess or enhancing mass to suggest malignancy. Findings were discussed with Dr. Ramesh Rodríguez at 5:08 pm on 9/20/2022.        Physical Examination:        General appearance:  alert, cooperative and no distress  Mental Status:  oriented to person, place and time and normal affect  Lungs:  clear to auscultation bilaterally, normal effort  Heart:  regular rate and rhythm, no murmur  Abdomen:  soft, nontender, nondistended, normal bowel sounds, no masses, hepatomegaly, splenomegaly  Extremities:  no edema, redness, tenderness in the calves  Skin:  no gross lesions, rashes, induration    Assessment:        Hospital Problems             Last Modified POA    * (Principal) Epiglottitis 9/21/2022 Yes    Vitamin D deficiency 9/21/2022 Yes    Acute hyponatremia 9/21/2022 Yes    Diaphoresis 9/21/2022 Yes    COVID-19 9/21/2022 Yes    Leukemoid reaction 9/21/2022 Yes    Thrombocytopenia ([address] 9/21/2022 Yes    Hyperglycemia 9/21/2022 Yes    Lactic acidosis 9/21/2022 Yes    Elevated C-reactive protein (CRP) 9/21/2022 Yes    Airway compromise 9/21/2022 Yes    COVID 9/21/2022 Yes       Plan:        Epiglottitis: Case discussed morning of 9/22/2022 with ENT. Ruled out. Advance diet as tolerated.   Continue steroids for 3 to 5 days post discharge, Keflex as outpatient   COVID-19 viral infection detected with leukemoid reaction: Paxlovid as outpatient, already in patient room  Elevated CRP: From COVID-19 viral infection  Vit D deficiency: Continue supplementation  Dispo: Home today, discussed with ENT    DEV Bland NP  9/22/2022  11:13 AM  prog

## 2022-09-22 NOTE — PROGRESS NOTES
pharyngitis, epiglottitis and flulike symptoms. Patient previously had COVID-19 in October of 2021 and was treated with bamlanivimab/etesevimab. Patient states that he is unvaccinated. Patient originally presented to [place] ED with acute severe epiglottitis/supraglottitis. Patient states that he also developed flulike symptoms on Sunday 9-18-22, including sore scratchy throat, dysphagia, cough, odynophagia, that have been increasingly getting worse over the last few days. He states that \" he cannot eat or drink anything\" over the last 24 hours. Was recommended that the patient transfer to [facility] for high risk airway evaluation. Since presenting to [facility] the patient has had a ENT evaluation. No indication for endoscopic or intervention. Patient was started on IV Decadron and Rocephin with clinical improvement. He was also placed on nasal cannula for oxygen. According to internal medicine, review of systems was extremely limited due to patient's extreme lethargic state. However he denies any chest pain or shortness of breath. He denies any headaches, fevers, chills, nausea, vomiting, diarrhea, body aches. Patient admitted because of concerns with COVID 19.    CURRENT EVALUATION : 9/22/2022    Afebrile  VS stable    Patient states that he is doing better today. No new complaints  Patient states that he was given Paxlovid starting on 9-21-22  He will continue this medication at home. Discharge arrangements in progress    CRP has slightly decreased    Patient exhibiting respiratory distress: No    Patient is currently not receiving supplemental oxygen. He is on room air  RR: 18--> 18  02 sat: 96--> 95       NEWS Score: 0-4 Low risk group; 5-6: Medium risk group; 7 or above: High risk group  Parameters 3 2 1 0 1 2 3   Age    < 65   ? 65   RR ? 8  9-11 12-20  21-24 ? 25   O2 Sats ? 91 92-93 94-95 ? 96      Suppl O2  Yes  No      SBP ? 90  101-110 111-219   ? 220   HR ? 40  41-50 51-90  111-130 ? 131   Consciousness    Alert   Drowsiness, lethargy, or confusion   Temperature ? 35.0 C (95.0 F)  35.1-36.0 C 95.1-96.9 F 36.1-38.0 C 97.0-100.4 F 38.1-39.0 C 100.5-102.3 F ? 39.1 C ? 102.4 F      NEWS Score:  9-21-22: 6 Medium Risk for ICU care  9/22/2022: 4    Overall Daily Picture:   Improving    Presence of secondary bacterial Infection:  Yes  Additional antibiotics: Ceftriaxone    Labs, X rays reviewed: 9/22/2022  No new labs taken prior to today's visit. We will continue to monitor labs once they are drawn. BUN: 16-->  Cr: 0.70-->    WBC: 19.9--> (on steroid therapy)  Hb: 14.1-->  Plat: 137-->    Absolute Neutrophils: 16.91  Absolute Lymphocytes: 1.99  Neutrophil/Lymphocyte Ratio: 8.5 High Risk Covid    CRP: 105.7--> 91.7  Ferritin:  LDH:     Pro Calcitonin:    Cultures:  Urine:    Blood:  9/20/2022: No growth x1  Sputum :    Wound:      Strep:  Negative  COVID:  Positive  Influenza:  Negative    9/20/2022:      Chest x-ray: 9-20-22    Impression   No acute cardiopulmonary disease     X-ray of neck soft tissue:    FINDINGS:   Mild degenerative changes are present in C6 and C7. A mild levo scoliotic   curvature is present. No acute fracture, subluxation or prevertebral soft   tissue swelling is present. Lung apices are unremarkable. No subglottic   airway narrowing. Epiglottis appears blunted. CT neck soft tissue:  Impression   Findings of acute supraglottitis/epiglottitis with associated moderate to   severe airway narrowing. No evidence of abscess or enhancing mass to suggest   malignancy. Findings were discussed with Dr. Shay Aquino at 5:08 pm on 9/20/2022. Discussed with patient, RN, CC, IM. I have personally reviewed the past medical history, past surgical history, medications, social history, and family history, and I have updated the database accordingly. Past Medical History:   History reviewed.  No pertinent past medical history. Past Surgical  History:     Past Surgical History:   Procedure Laterality Date    ACHILLES TENDON SURGERY Bilateral        Medications:      sodium chloride flush  5-40 mL IntraVENous 2 times per day    enoxaparin  30 mg SubCUTAneous BID    cefTRIAXone (ROCEPHIN) IV  1,000 mg IntraVENous Q24H    dexamethasone  10 mg IntraVENous Q8H    nirmatrelvir/ritonavir  3 tablet Oral Q12H       Social History:     Social History     Socioeconomic History    Marital status:      Spouse name: Not on file    Number of children: Not on file    Years of education: Not on file    Highest education level: Not on file   Occupational History    Not on file   Tobacco Use    Smoking status: Never    Smokeless tobacco: Never   Vaping Use    Vaping Use: Never used   Substance and Sexual Activity    Alcohol use: Yes     Comment: occasionally    Drug use: Never    Sexual activity: Not on file   Other Topics Concern    Not on file   Social History Narrative    Not on file     Social Determinants of Health     Financial Resource Strain: Not on file   Food Insecurity: Not on file   Transportation Needs: Not on file   Physical Activity: Not on file   Stress: Not on file   Social Connections: Not on file   Intimate Partner Violence: Not on file   Housing Stability: Not on file       Family History:   History reviewed. No pertinent family history. Allergies:   Patient has no known allergies. Review of Systems:       Constitutional: No fevers or chills. No systemic complaints  Head: No headaches  Eyes: No double vision or blurry vision. No conjunctival inflammation. ENT: Improvement in: Sore throat. Difficulty swallowing. No runny nose. . No hearing loss, tinnitus or vertigo. Cardiovascular: No chest pain or palpitations. No Shortness of breath. No SCHUMACHER  Lung: No Shortness of breath or cough. No sputum production  Abdomen: No nausea, vomiting, diarrhea, or abdominal pain. Bonita Balling No cramps.   Genitourinary: No increased urinary frequency, or dysuria. No hematuria. No suprapubic or CVA pain  Musculoskeletal: No muscle aches or pains. No joint effusions, swelling or deformities  Hematologic: No bleeding or bruising. Neurologic: No headache, weakness, numbness, or tingling. Integument: No rash, no ulcers. Psychiatric: No depression. Endocrine: No polyuria, no polydipsia, no polyphagia. Physical Examination :   Patient Vitals for the past 8 hrs:   BP Temp Temp src Pulse Resp SpO2   09/22/22 0811 (!) 130/93 97.7 °F (36.5 °C) Oral 63 18 95 %   09/22/22 0800 -- -- -- 63 -- --   09/22/22 0510 -- -- -- -- -- 97 %   09/22/22 0459 -- -- -- -- -- 96 %   09/22/22 0445 134/89 97.7 °F (36.5 °C) Oral 55 19 92 %     General Appearance: Awake, alert, and in no apparent distress  Head:  Normocephalic, no trauma  Eyes: Pupils equal, round, reactive to light; sclera anicteric; conjunctivae pink. No embolic phenomena. ENT: Improvement in: Sore throat. Enlarged Tongue. Neck: Supple, without lymphadenopathy. Thyroid normal, No bruits. Pulmonary/Chest: Clear to auscultation, without wheezes, rales, or rhonchi. No dullness to percussion. Cardiovascular: Regular rate and rhythm without murmurs, rubs, or gallops. Abdomen: Soft, non tender. Bowel sounds normal. No organomegaly  All four Extremities: No cyanosis, clubbing, edema, or effusions. Neurologic: No gross sensory or motor deficits. Skin: Warm and dry with good turgor. No signs of peripheral arterial or venous insufficiency. No ulcerations. No open wounds.     Medical Decision Making -Laboratory:   I have independently reviewed/ordered the following labs:    CBC with Differential:   Recent Labs     09/20/22  1500 09/21/22  0140   WBC 18.3* 19.9*   HGB 14.5 14.1   HCT 44.0 44.2    137*   LYMPHOPCT 28 10*   MONOPCT 9* 2     BMP:   Recent Labs     09/20/22  1500 09/21/22  0140   * 138   K 3.7 3.9   CL 97* 104   CO2 27 21   BUN 15 16   CREATININE 1.10 0.72     Hepatic Function Panel: Recent Labs     09/21/22  0140   PROT 7.6   LABALBU 3.9   BILITOT 0.9   ALKPHOS 68   ALT 24   AST 20     No results for input(s): RPR in the last 72 hours. No results for input(s): HIV in the last 72 hours. No results for input(s): BC in the last 72 hours. Lab Results   Component Value Date/Time    RBC 4.90 09/21/2022 01:40 AM    WBC 19.9 09/21/2022 01:40 AM     Lab Results   Component Value Date/Time    CREATININE 0.72 09/21/2022 01:40 AM    GLUCOSE 193 09/21/2022 01:40 AM       Medical Decision Making-Imaging:     Narrative   EXAMINATION:   TWO XRAY VIEWS OF THE NECK SOFT TISSUES       9/20/2022 2:59 pm       COMPARISON:   None. HISTORY:   ORDERING SYSTEM PROVIDED HISTORY: sore throat   TECHNOLOGIST PROVIDED HISTORY:   sore throat   Reason for Exam: Cough, sore throat       FINDINGS:   Mild degenerative changes are present in C6 and C7. A mild levo scoliotic   curvature is present. No acute fracture, subluxation or prevertebral soft   tissue swelling is present. Lung apices are unremarkable. No subglottic   airway narrowing. Epiglottis appears blunted. Impression   Blunted thumb like epiglottis. Epiglottitis not excluded. RECOMMENDATION:   Advise CT soft tissues neck. Narrative   EXAMINATION:   TWO XRAY VIEWS OF THE CHEST       9/20/2022 11:59 am       COMPARISON:   None. HISTORY:   ORDERING SYSTEM PROVIDED HISTORY: cough   TECHNOLOGIST PROVIDED HISTORY:   cough   Reason for Exam: Cough, sore throat       FINDINGS:   . The cardiac size is normal. No acute infiltrates or pleural effusions are   seen. Pulmonary vascularity appears normal. There is mild ectasia of the   thoracic aorta. There are degenerative changes in the spine . No acute bony   abnormalities.  The hilar structures are normal.           Impression   No acute cardiopulmonary disease     Narrative   EXAMINATION:   CT OF THE NECK SOFT TISSUE WITH CONTRAST  9/20/2022       TECHNIQUE:   CT of the neck was with associated moderate to   severe airway narrowing. No evidence of abscess or enhancing mass to suggest   malignancy. Findings were discussed with Dr. Lynch at 5:08 pm on 9/20/2022. Medical Decision Qhxrar-Qhrfvwxd-Ejcve:       Medical Decision Making-Other:     Note:  Labs, medications, radiologic studies were reviewed with personal review of films  Moderate Large amounts of data were reviewed  Discussed with nursing Staff, Discharge planner  Infection Control and Prevention measures reviewed  All prior entries were reviewed  Administer medications as ordered  Prognosis: Alfred Paigew  Discharge planning reviewed  Follow up as outpatient. Thank you for allowing us to participate in the care of this patient. Please call with questions. Baljit Negro DPM  Podiatric Medicine & Surgery, PGY-1  [place] [address], [address] [facility]:    I have discussed the case, including pertinent history and exam findings with the residents and students. I have seen and examined the patient and the key elements of the encounter have been performed by me. I was present when the student obtained his information or examined the patient. I have reviewed the laboratory data, other diagnostic studies and discussed them with the residents. I have updated the medical record where necessary. I agree with the assessment, plan and orders as documented by the resident/ student.     Dante Garcias MD.

## 2022-09-23 ENCOUNTER — CARE COORDINATION (OUTPATIENT)
Dept: CASE MANAGEMENT | Age: 67
End: 2022-09-23

## 2022-09-23 NOTE — DISCHARGE SUMMARY
[facility]  Office: [address], DO, Amber [facility], DO, Leah July, DO, Darakaren Taveras Blood, DO, José Ortiz MD, Tania Watkins MD, Jennifer Perla MD, Sylvie Bernheim, MD,  Asuncion Ross MD, Beryl Rothman MD, Nicole Grover, DO, Yeni Hudson MD,  Nemesio Strong MD, Mikaela Loredo MD, Kang Caraballo, DO, Lee Ann Becerra MD, Johan Linares MD, Laura Meza MD, Flaquita Pritchard MD, Jahaira Koroma MD, Natalio Howell MD, Shari Orourke DO, Leon Whitman MD, Nisha Solomon MD, Tanesha Smiley CNP,  Tequila Vitale, CNP, Fareed Brunner, CNP, Zaid Bynum, CNP,  Valery Yi DNP, Jose Bose, CNP, Sonia Matson, CNP, Mu Tierney, CNP, Casie Lopes, CNP, Ashwini Wiley, [facility], Anatoly Sanchez PATHAIS, Jaime Albert, [facility], Portillo Batres, [facility], Suleman Melchor, CNP, Margie, CNP, Romana Ignacio, [facility]    Discharge Summary     Patient ID: Claudio Akhtar  :  1955   MRN: [id number]     ACCOUNT:  [de-identified]   Patient's PCP: Fermin Oneill MD  Admit Date: 2022   Discharge Date: 22  Length of Stay: 2  Code Status:  Prior  Admitting Physician: No admitting provider for patient encounter.   Discharge Physician: DEV Negron NP     Active Discharge Diagnoses:     Hospital Problem Lists:  Principal Problem:    Epiglottitis  Active Problems:    Vitamin D deficiency    COVID-19    Leukemoid reaction    Thrombocytopenia (HCC)    Hyperglycemia    Elevated C-reactive protein (CRP)    Airway compromise    COVID  Resolved Problems:    Acute hyponatremia    Diaphoresis    Lactic acidosis      Admission Condition:  good     Discharged Condition: stable    Hospital Stay:     Hospital Course:  Claudio Akhtar is a 79 y.o. male who was admitted for the management of   Epiglottitis , presented to ER with Pharyngitis (Since Saturday, epiglottitis, denies SOB)    58-year-old male with a 1 week history of worsening nasal congestion, pharyngitis, and admitted for possible epiglottitis glottitis. Status post emergent eval by ENT without indication for endoscopy or intervention. Started on IV Decadron, Rocephin and admitted for further work-up and management      Significant therapeutic interventions:     Epiglottitis: Case discussed morning of 9/22/2022 with ENT. Ruled out. Advance diet as tolerated.   Continue steroids for 3 to 5 days post discharge, Keflex as outpatient   COVID-19 viral infection detected with leukemoid reaction: Paxlovid as outpatient, already in patient room  Elevated CRP: From COVID-19 viral infection  Vit D deficiency: Continue supplementation  Dispo: Home today, discussed with ENT    Significant Diagnostic Studies:   Labs / Micro:  CBC:   Lab Results   Component Value Date/Time    WBC 19.9 09/21/2022 01:40 AM    RBC 4.90 09/21/2022 01:40 AM    HGB 14.1 09/21/2022 01:40 AM    HCT 44.2 09/21/2022 01:40 AM    MCV 90.2 09/21/2022 01:40 AM    MCH 28.8 09/21/2022 01:40 AM    MCHC 31.9 09/21/2022 01:40 AM    RDW 14.4 09/21/2022 01:40 AM     09/21/2022 01:40 AM     BMP:    Lab Results   Component Value Date/Time    GLUCOSE 193 09/21/2022 01:40 AM     09/21/2022 01:40 AM    K 3.9 09/21/2022 01:40 AM     09/21/2022 01:40 AM    CO2 21 09/21/2022 01:40 AM    ANIONGAP 13 09/21/2022 01:40 AM    BUN 16 09/21/2022 01:40 AM    CREATININE 0.72 09/21/2022 01:40 AM    CALCIUM 8.6 09/21/2022 01:40 AM    LABGLOM >60 09/21/2022 01:40 AM    GFRAA >60 09/21/2022 01:40 AM    GFR      09/21/2022 01:40 AM     HFP:    Lab Results   Component Value Date/Time    PROT 7.6 09/21/2022 01:40 AM     CMP:    Lab Results   Component Value Date/Time    GLUCOSE 193 09/21/2022 01:40 AM     09/21/2022 01:40 AM    K 3.9 09/21/2022 01:40 AM     09/21/2022 01:40 AM    CO2 21 09/21/2022 01:40 AM    BUN 16 09/21/2022 01:40 AM    CREATININE 0.72 09/21/2022 01:40 AM    ANIONGAP 13 09/21/2022 01:40 AM    ALKPHOS 68 09/21/2022 01:40 AM    ALT 24 09/21/2022 01:40 AM    AST 20 09/21/2022 01:40 AM    BILITOT 0.9 09/21/2022 01:40 AM    LABALBU 3.9 09/21/2022 01:40 AM    ALBUMIN 1.1 09/21/2022 01:40 AM    LABGLOM >60 09/21/2022 01:40 AM    GFRAA >60 09/21/2022 01:40 AM    GFR      09/21/2022 01:40 AM    PROT 7.6 09/21/2022 01:40 AM    CALCIUM 8.6 09/21/2022 01:40 AM     PT/INR:    Lab Results   Component Value Date/Time    PROTIME 10.8 09/21/2022 01:40 AM    INR 1.1 09/21/2022 01:40 AM     PTT:   Lab Results   Component Value Date/Time    APTT 26.7 09/21/2022 01:40 AM     FLP:  No results found for: CHOL, TRIG, HDL  U/A:  No results found for: Janora Keens, SPECGRAV, HGBUR, PHUR, PROTEINU, GLUCOSEU, KETUA, BILIRUBINUR, UROBILINOGEN, NITRU, LEUKOCYTESUR  TSH:  No results found for: TSH     Radiology:  XR NECK SOFT TISSUE    Result Date: 9/20/2022  Blunted thumb like epiglottis. Epiglottitis not excluded. RECOMMENDATION: Advise CT soft tissues neck. XR CHEST (2 VW)    Result Date: 9/20/2022  No acute cardiopulmonary disease     CT SOFT TISSUE NECK W CONTRAST    Result Date: 9/20/2022  Findings of acute supraglottitis/epiglottitis with associated moderate to severe airway narrowing. No evidence of abscess or enhancing mass to suggest malignancy. Findings were discussed with Dr. Trae Tovar at 5:08 pm on 9/20/2022. Consultations:    Consults:     Final Specialist Recommendations/Findings:   IP CONSULT TO OTOLARYNGOLOGY  IP CONSULT TO CRITICAL CARE  IP CONSULT TO INTERNAL MEDICINE  IP CONSULT TO INFECTIOUS DISEASES      The patient was seen and examined on day of discharge and this discharge summary is in conjunction with any daily progress note from day of discharge.     Discharge plan:     Disposition: Home    Physician Follow Up:     Elizabeth Fajardo MD  [facility] [id number]  [place] [address] [place] [address]  [phone]    Schedule an appointment as soon as possible for a visit in 1 week(s)  For hospital follow-up Requiring Further Evaluation/Follow Up POST HOSPITALIZATION/Incidental Findings: continue to take all medications as prescribed, wear mask until 9/28    Diet: regular diet    Activity: As tolerated    Instructions to Patient: see attached     Discharge Medications:      Medication List        START taking these medications      cephALEXin 500 MG capsule  Commonly known as: KEFLEX  Take 1 capsule by mouth 4 times daily for 6 days     predniSONE 20 MG tablet  Commonly known as: DELTASONE  Take 2 tabs daily for 3 days then take 1 tab daily for 3 days               Where to Get Your Medications        These medications were sent to [facility] [address] [facility], [address] [place] [address], [address] [address]      Phone: [phone]   cephALEXin 500 MG capsule  predniSONE 20 MG tablet         No discharge procedures on file. Time Spent on discharge is  31 mins in patient examination, evaluation, counseling as well as medication reconciliation, prescriptions for required medications, discharge plan and follow up. Electronically signed by   DEV Zapata NP  9/23/2022  9:57 AM      Thank you Dr. Mirian Interiano MD for the opportunity to be involved in this patient's care.

## 2022-09-23 NOTE — CARE COORDINATION
filled?: Yes  Have you been contacted by a [address]?: No  Have you scheduled your follow up appointment?: Yes (Comment: [facility])  How are you going to get to your appointment?: Car - drive self  Do you feel like you have everything you need to keep you well at home?: Yes  Care Transitions Interventions         Was this an external facility discharge? No     Challenges to be reviewed by the provider   Additional needs identified to be addressed with provider: No  none             Method of communication with provider : none    Advance Care Planning:   Does patient have an Advance Directive: not on file. Care Transition Nurse contacted the patient by telephone to perform post hospital discharge assessment. Verified name and  with patient as identifiers. Provided introduction to self, and explanation of the CTN role. CTN reviewed discharge instructions, medical action plan and red flags with patient who verbalized understanding. Patient given an opportunity to ask questions and does not have any further questions or concerns at this time. Were discharge instructions available to patient? Yes. Reviewed appropriate site of care based on symptoms and resources available to patient including: PCP  CTN . The patient agrees to contact the PCP office for questions related to their healthcare. Medication reconciliation was performed with patient, who verbalizes understanding of administration of home medications. Was patient discharged with a pulse oximeter? no    CTN provided contact information. No further follow-up call indicated based on severity of symptoms and risk factors. [facility]CP    CT - COVID episode resolved.        Follow Up  PCP - one week    Herb Lim RN

## 2022-09-25 LAB
CULTURE: NORMAL
CULTURE: NORMAL
Lab: NORMAL
Lab: NORMAL
SPECIMEN DESCRIPTION: NORMAL
SPECIMEN DESCRIPTION: NORMAL

## 2022-10-05 NOTE — PROGRESS NOTES
Physician Progress Note      PATIENTFEDE Martinez  CSN #:                  [id number]  :                       1955  ADMIT DATE:       2022 7:06 PM  [facility] DATE:        2022 3:44 PM  RESPONDING  PROVIDER #:        Kellen Lau NP          QUERY TEXT:    Pt admitted with COVID-19 and noted to have elevated WBC and CRP. If   possible, please document in progress notes and discharge summary if you are   evaluating and/or treating: The medical record reflects the following:  Risk Factors: COVID-19+, epiglottitis/supraglottitis  Clinical Indicators: WBC 19.9 and , lactic acid 2.2, blood cx neg, CXR   showed no acute process, per d/c summary \"COVID-19 viral infection detected   with leukemoid reaction, Elevated CRP: From COVID-19, epiglottitis-ruled out\",   afebrile, no tachycardia or tachypnea  Treatment: IV Decadron and Rocephin, Paxlovid, CT, supplemental O2, ID and ENT   consult-conservative tx    Thank you, Tyler Quigley, CDI  email - [email or web address] [phone]  office hours [id number]  Options provided:  -- Sepsis present on admission due to COVID-19 infection  -- Covid-19 infection without sepsis  -- Other - I will add my own diagnosis  -- Disagree - Not applicable / Not valid  -- Disagree - Clinically unable to determine / Unknown  -- Refer to Clinical Documentation Reviewer    PROVIDER RESPONSE TEXT:    This patient has sepsis which was present on admission due to COVID-19   infection.     Query created by: Nunu Pyle on 2022 10:44 AM      Electronically signed by:  Karin Lau NP 10/5/2022 12:03 PM